# Patient Record
Sex: FEMALE | Race: BLACK OR AFRICAN AMERICAN | Employment: FULL TIME | URBAN - METROPOLITAN AREA
[De-identification: names, ages, dates, MRNs, and addresses within clinical notes are randomized per-mention and may not be internally consistent; named-entity substitution may affect disease eponyms.]

---

## 2017-06-29 ENCOUNTER — OFFICE VISIT (OUTPATIENT)
Dept: FAMILY MEDICINE CLINIC | Age: 22
End: 2017-06-29

## 2017-06-29 VITALS
HEIGHT: 65 IN | WEIGHT: 176 LBS | TEMPERATURE: 98.6 F | DIASTOLIC BLOOD PRESSURE: 84 MMHG | SYSTOLIC BLOOD PRESSURE: 122 MMHG | RESPIRATION RATE: 18 BRPM | OXYGEN SATURATION: 99 % | BODY MASS INDEX: 29.32 KG/M2 | HEART RATE: 78 BPM

## 2017-06-29 DIAGNOSIS — J01.00 ACUTE MAXILLARY SINUSITIS, RECURRENCE NOT SPECIFIED: Primary | ICD-10-CM

## 2017-06-29 DIAGNOSIS — Z30.41 ENCOUNTER FOR SURVEILLANCE OF CONTRACEPTIVE PILLS: ICD-10-CM

## 2017-06-29 RX ORDER — NORGESTIMATE AND ETHINYL ESTRADIOL 7DAYSX3 28
1 KIT ORAL DAILY
Qty: 28 TAB | Refills: 12 | Status: SHIPPED | OUTPATIENT
Start: 2017-06-29 | End: 2018-07-02 | Stop reason: SDUPTHER

## 2017-06-29 RX ORDER — CEFDINIR 300 MG/1
300 CAPSULE ORAL 2 TIMES DAILY
Qty: 20 CAP | Refills: 0 | Status: SHIPPED | OUTPATIENT
Start: 2017-06-29 | End: 2017-07-09

## 2017-06-29 NOTE — PROGRESS NOTES
HPI/ROS  Patient complains of bilateral ear pressure/pain. Symptoms include congestion, headache described as frontal, lightheadedness, low grade fever, post nasal drip, productive cough with  yellow colored sputum, sinus pressure, tooth pain and vertigo. Onset of symptoms was 5 days ago, gradually worsening since that time. Patient is drinking plenty of fluids. .  Past history is significant for no history of pneumonia or bronchitis. Patient is non-smoker. She is using mucinex otc without improvement. Visit Vitals    /84 (BP 1 Location: Right arm, BP Patient Position: Sitting)    Pulse 78    Temp 98.6 °F (37 °C) (Oral)    Resp 18    Ht 5' 5\" (1.651 m)    Wt 176 lb (79.8 kg)    LMP 05/30/2017    SpO2 99%    BMI 29.29 kg/m2     Physical Examination:   GENERAL ASSESSMENT: well developed and well nourished  SKIN: normal color, no lesions  HEAD: normocephalic  EYES: normal eyes  EARS: external auditory canal: clear and tympanic membrane: yellow, bulging  NOSE: normal external appearance and nares patent  MOUTH: yellow exudates of OP  NECK: normal  CHEST: normal air exchange, no rales, no rhonchi, no wheezes, respiratory effort normal with no retractions  HEART: regular rate and rhythm, normal S1/S2, no murmurs  ABDOMEN:  not examined  EXTREMITY: not examined  NEURO: not examined    Amber was seen today for sinus infection and eye drainage. Diagnoses and all orders for this visit:    Acute maxillary sinusitis, recurrence not specified    Encounter for surveillance of contraceptive pills  -     norgestimate-ethinyl estradiol (TRI-SPRINTEC, 28,) 0.18/0.215/0.25 mg-35 mcg (28) tab; Take 1 Tab by mouth daily. Other orders  -     cefdinir (OMNICEF) 300 mg capsule; Take 1 Cap by mouth two (2) times a day for 10 days. Reveiwed adr/se of medication  Push fluids, rest, suggested mucinex for congestion and drainage  Recheck 5-7 days if sx not improved.     I have discussed the diagnosis with the patient and the intended plan as seen in the above orders. The patient has received an after-visit summary and questions were answered concerning future plans. Patient conveyed understanding of the plan at the time of the visit. PRASHANTH Watkins, ANP  6/29/2017    Follow-up Disposition:  Return for well exam, fasting labs, and pap smear.

## 2017-06-29 NOTE — PROGRESS NOTES
Chief Complaint   Patient presents with    Sinus Infection    Eye Drainage     rt eye     Patient in office today for sx that began Tuesday; have not treated with otc. 1. Have you been to the ER, urgent care clinic since your last visit? Hospitalized since your last visit? No    2. Have you seen or consulted any other health care providers outside of the [address] [place] [address] since your last visit? Include any pap smears or colon screening.  No

## 2017-06-29 NOTE — MR AVS SNAPSHOT
Visit Information Date & Time Provider Department Dept. Phone Encounter #  
 6/29/2017  7:00 AM Julian Raymond NP [facility] [phone] [id number] Follow-up Instructions Return for well exam, fasting labs, and pap smear. Upcoming Health Maintenance Date Due  
 HPV AGE 9Y-34Y (1 of 3 - Female 3 Dose Series) 4/16/2005 DTaP/Tdap/Td series (1 - Tdap) 4/16/2015 INFLUENZA AGE 9 TO ADULT 8/1/2017 PAP AKA CERVICAL CYTOLOGY 12/11/2018 Allergies as of 6/29/2017  Review Complete On: 6/29/2017 By: Nuria Ruth LPN No Known Allergies Current Immunizations  Never Reviewed Name Date  
 TB Skin Test (PPD) Intradermal 7/25/2014 Not reviewed this visit You Were Diagnosed With   
  
 Codes Comments Acute maxillary sinusitis, recurrence not specified    -  Primary ICD-10-CM: J01.00 ICD-9-CM: 461.0 Encounter for surveillance of contraceptive pills     ICD-10-CM: Z30.41 ICD-9-CM: V25.41 Vitals BP Pulse Temp Resp Height(growth percentile) Weight(growth percentile) 122/84 (BP 1 Location: Right arm, BP Patient Position: Sitting) 78 98.6 °F (37 °C) (Oral) 18 5' 5\" (1.651 m) 176 lb (79.8 kg) LMP SpO2 BMI OB Status Smoking Status 05/30/2017 99% 29.29 kg/m2 Having regular periods Never Smoker Vitals History BMI and BSA Data Body Mass Index Body Surface Area  
 29.29 kg/m 2 1.91 m 2 Preferred Pharmacy Pharmacy Name Phone [facility] [address], [address]  [phone] Your Updated Medication List  
  
   
This list is accurate as of: 6/29/17  7:25 AM.  Always use your most recent med list.  
  
  
  
  
 cefdinir 300 mg capsule Commonly known as:  OMNICEF Take 1 Cap by mouth two (2) times a day for 10 days. cetirizine 10 mg tablet Commonly known as:  ZYRTEC Take 1 Tab by mouth daily. norgestimate-ethinyl estradiol 0.18/0.215/0.25 mg-35 mcg (28) Tab Commonly known as:  TRI-SPRINTEC (28) Take 1 Tab by mouth daily. Prescriptions Sent to Pharmacy Refills  
 cefdinir (OMNICEF) 300 mg capsule 0 Sig: Take 1 Cap by mouth two (2) times a day for 10 days. Class: Normal  
 Pharmacy: [facility] [address], [address] Ph #: [phone] Route: Oral  
 norgestimate-ethinyl estradiol (TRI-SPRINTEC, 28,) 0.18/0.215/0.25 mg-35 mcg (28) tab 12 Sig: Take 1 Tab by mouth daily. Class: Normal  
 Pharmacy: [facility] [address], [address] Ph #: [phone] Route: Oral  
  
Follow-up Instructions Return for well exam, fasting labs, and pap smear. Introducing [facility]! Kelsey Doe introduces [employer] patient portal. Now you can access parts of your medical record, email your doctor's office, and request medication refills online. 1. In your internet browser, go to [email or web address] 2. Click on the First Time User? Click Here link in the Sign In box. You will see the New Member Sign Up page. 3. Enter your [employer] Access Code exactly as it appears below. You will not need to use this code after youve completed the sign-up process. If you do not sign up before the expiration date, you must request a new code. · [employer] Access Code: [email or web address] Expires: 9/27/2017  7:25 AM 
 
4. Enter the last four digits of your Social Security Number (xxxx) and Date of Birth (mm/dd/yyyy) as indicated and click Submit. You will be taken to the next sign-up page. 5. Create a [employer] ID. This will be your [employer] login ID and cannot be changed, so think of one that is secure and easy to remember. 6. Create a [employer] password. You can change your password at any time. 7. Enter your Password Reset Question and Answer. This can be used at a later time if you forget your password. 8. Enter your e-mail address. You will receive e-mail notification when new information is available in [address] 9. Click Sign Up. You can now view and download portions of your medical record. 10. Click the Download Summary menu link to download a portable copy of your medical information. If you have questions, please visit the Frequently Asked Questions section of the [employer] website. Remember, [employer] is NOT to be used for urgent needs. For medical emergencies, dial 911. Now available from your iPhone and Android! Please provide this summary of care documentation to your next provider. Your primary care clinician is listed as [facility]. If you have any questions after today's visit, please call [phone].

## 2018-07-02 DIAGNOSIS — Z30.41 ENCOUNTER FOR SURVEILLANCE OF CONTRACEPTIVE PILLS: ICD-10-CM

## 2018-07-02 RX ORDER — NORGESTIMATE AND ETHINYL ESTRADIOL 7DAYSX3 28
1 KIT ORAL DAILY
Qty: 28 TAB | Refills: 12 | Status: SHIPPED | OUTPATIENT
Start: 2018-07-02 | End: 2019-06-24 | Stop reason: SDUPTHER

## 2019-04-09 ENCOUNTER — OFFICE VISIT (OUTPATIENT)
Dept: FAMILY MEDICINE CLINIC | Age: 24
End: 2019-04-09

## 2019-04-09 VITALS
TEMPERATURE: 98.3 F | OXYGEN SATURATION: 100 % | RESPIRATION RATE: 12 BRPM | HEART RATE: 79 BPM | WEIGHT: 182 LBS | SYSTOLIC BLOOD PRESSURE: 116 MMHG | HEIGHT: 65 IN | DIASTOLIC BLOOD PRESSURE: 77 MMHG | BODY MASS INDEX: 30.32 KG/M2

## 2019-04-09 DIAGNOSIS — Z00.00 WELL ADULT EXAM: Primary | ICD-10-CM

## 2019-04-09 RX ORDER — CETIRIZINE HCL 10 MG
10 TABLET ORAL DAILY
Qty: 30 TAB | Refills: 5 | Status: SHIPPED | OUTPATIENT
Start: 2019-04-09 | End: 2020-03-06 | Stop reason: SDUPTHER

## 2019-04-09 NOTE — PROGRESS NOTES
Chief Complaint   Patient presents with    Complete Physical    Labs     Pt in office today for cpe/labs  Pt would like refill on zyrtec    Pt has no other concerns

## 2019-04-10 LAB
ALBUMIN SERPL-MCNC: 4.3 G/DL (ref 3.5–5.5)
ALBUMIN/GLOB SERPL: 1.3 {RATIO} (ref 1.2–2.2)
ALP SERPL-CCNC: 51 IU/L (ref 39–117)
ALT SERPL-CCNC: 12 IU/L (ref 0–32)
AST SERPL-CCNC: 14 IU/L (ref 0–40)
BASOPHILS # BLD AUTO: 0 X10E3/UL (ref 0–0.2)
BASOPHILS NFR BLD AUTO: 0 %
BILIRUB SERPL-MCNC: 0.3 MG/DL (ref 0–1.2)
BUN SERPL-MCNC: 7 MG/DL (ref 6–20)
BUN/CREAT SERPL: 9 (ref 9–23)
CALCIUM SERPL-MCNC: 9.4 MG/DL (ref 8.7–10.2)
CHLORIDE SERPL-SCNC: 100 MMOL/L (ref 96–106)
CHOLEST SERPL-MCNC: 202 MG/DL (ref 100–199)
CO2 SERPL-SCNC: 22 MMOL/L (ref 20–29)
CREAT SERPL-MCNC: 0.81 MG/DL (ref 0.57–1)
EOSINOPHIL # BLD AUTO: 0.1 X10E3/UL (ref 0–0.4)
EOSINOPHIL NFR BLD AUTO: 1 %
ERYTHROCYTE [DISTWIDTH] IN BLOOD BY AUTOMATED COUNT: 14.2 % (ref 12.3–15.4)
GLOBULIN SER CALC-MCNC: 3.2 G/DL (ref 1.5–4.5)
GLUCOSE SERPL-MCNC: 91 MG/DL (ref 65–99)
HCT VFR BLD AUTO: 37.2 % (ref 34–46.6)
HDLC SERPL-MCNC: 72 MG/DL
HGB BLD-MCNC: 11.3 G/DL (ref 11.1–15.9)
IMM GRANULOCYTES # BLD AUTO: 0 X10E3/UL (ref 0–0.1)
IMM GRANULOCYTES NFR BLD AUTO: 0 %
INTERPRETATION, 910389: NORMAL
LDLC SERPL CALC-MCNC: 115 MG/DL (ref 0–99)
LYMPHOCYTES # BLD AUTO: 1.6 X10E3/UL (ref 0.7–3.1)
LYMPHOCYTES NFR BLD AUTO: 36 %
MCH RBC QN AUTO: 25.2 PG (ref 26.6–33)
MCHC RBC AUTO-ENTMCNC: 30.4 G/DL (ref 31.5–35.7)
MCV RBC AUTO: 83 FL (ref 79–97)
MONOCYTES # BLD AUTO: 0.4 X10E3/UL (ref 0.1–0.9)
MONOCYTES NFR BLD AUTO: 8 %
MORPHOLOGY BLD-IMP: ABNORMAL
NEUTROPHILS # BLD AUTO: 2.5 X10E3/UL (ref 1.4–7)
NEUTROPHILS NFR BLD AUTO: 55 %
PLATELET # BLD AUTO: 314 X10E3/UL (ref 150–379)
POTASSIUM SERPL-SCNC: 4.6 MMOL/L (ref 3.5–5.2)
PROT SERPL-MCNC: 7.5 G/DL (ref 6–8.5)
RBC # BLD AUTO: 4.48 X10E6/UL (ref 3.77–5.28)
SODIUM SERPL-SCNC: 138 MMOL/L (ref 134–144)
TRIGL SERPL-MCNC: 77 MG/DL (ref 0–149)
TSH SERPL DL<=0.005 MIU/L-ACNC: 1.11 UIU/ML (ref 0.45–4.5)
VLDLC SERPL CALC-MCNC: 15 MG/DL (ref 5–40)
WBC # BLD AUTO: 4.5 X10E3/UL (ref 3.4–10.8)

## 2019-04-20 NOTE — PROGRESS NOTES
Subjective:   21 y.o. female for Well Woman Check. Her gyne and breast care is done elsewhere by her Ob-Gyne physician. There are no active problems to display for this patient. Current Outpatient Medications   Medication Sig Dispense Refill    cetirizine (ZYRTEC) 10 mg tablet Take 1 Tab by mouth daily. 30 Tab 5    norgestimate-ethinyl estradiol (TRI-SPRINTEC, 28,) 0.18/0.215/0.25 mg-35 mcg (28) tab Take 1 Tab by mouth daily. 29 Tab 12     Family History   Problem Relation Age of Onset    Diabetes Mother     Cancer Father         lung     Social History     Tobacco Use    Smoking status: Never Smoker    Smokeless tobacco: Never Used   Substance Use Topics    Alcohol use: Yes             ROS: Feeling generally well. No TIA's or unusual headaches, no dysphagia. No prolonged cough. No dyspnea or chest pain on exertion. No abdominal pain, change in bowel habits, black or bloody stools. No urinary tract symptoms. No new or unusual musculoskeletal symptoms. Specific concerns today: none. Objective: The patient appears well, alert, oriented x 3, in no distress. Visit Vitals  /77 (BP 1 Location: Right arm, BP Patient Position: Sitting)   Pulse 79   Temp 98.3 °F (36.8 °C) (Oral)   Resp 12   Ht 5' 5\" (1.651 m)   Wt 182 lb (82.6 kg)   LMP 04/03/2019   SpO2 100%   BMI 30.29 kg/m²     ENT normal.  Neck supple. No adenopathy or thyromegaly. RAS. Lungs are clear, good air entry, no wheezes, rhonchi or rales. S1 and S2 normal, no murmurs, regular rate and rhythm. Abdomen soft without tenderness, guarding, mass or organomegaly. Extremities show no edema, normal peripheral pulses. Neurological is normal, no focal findings. Breast and Pelvic exams are deferred. Assessment/Plan:   Well Woman  lose weight, increase physical activity, follow low fat diet, follow low salt diet, routine labs ordered  Encounter Diagnoses   Name Primary?     Well adult exam Yes     Orders Placed This Encounter    LIPID PANEL    CBC WITH AUTOMATED DIFF    METABOLIC PANEL, COMPREHENSIVE    TSH 3RD GENERATION    CVD REPORT    cetirizine (ZYRTEC) 10 mg tablet     I have discussed the diagnosis with the patient and the intended plan as seen in the above orders. The patient has received an after-visit summary and questions were answered concerning future plans. Patient conveyed understanding of the plan at the time of the visit.     Toya Bond, MSN, ANP  4/20/2019

## 2019-06-24 ENCOUNTER — TELEPHONE (OUTPATIENT)
Dept: FAMILY MEDICINE CLINIC | Age: 24
End: 2019-06-24

## 2019-06-24 DIAGNOSIS — Z30.41 ENCOUNTER FOR SURVEILLANCE OF CONTRACEPTIVE PILLS: ICD-10-CM

## 2019-06-24 RX ORDER — NORGESTIMATE AND ETHINYL ESTRADIOL 7DAYSX3 28
1 KIT ORAL DAILY
Qty: 28 TAB | Refills: 12 | Status: SHIPPED | OUTPATIENT
Start: 2019-06-24 | End: 2020-06-22

## 2019-06-24 NOTE — TELEPHONE ENCOUNTER
----- Message from Roz Lugo sent at 6/24/2019  9:20 AM EDT -----  Regarding: OLGA Castillo/refill  Pt is requesting a refill on Tri-Sprintec, she is currently out of this medication. Pharmacy is  on file.  Pt's Best Contact Number: [phone]

## 2020-03-08 RX ORDER — CETIRIZINE HCL 10 MG
10 TABLET ORAL DAILY
Qty: 30 TAB | Refills: 5 | Status: SHIPPED | OUTPATIENT
Start: 2020-03-08 | End: 2020-09-27

## 2020-06-19 DIAGNOSIS — Z30.41 ENCOUNTER FOR SURVEILLANCE OF CONTRACEPTIVE PILLS: ICD-10-CM

## 2020-06-21 DIAGNOSIS — Z30.41 ENCOUNTER FOR SURVEILLANCE OF CONTRACEPTIVE PILLS: ICD-10-CM

## 2020-06-22 RX ORDER — NORGESTIMATE AND ETHINYL ESTRADIOL 7DAYSX3 28
KIT ORAL
Qty: 28 TAB | Refills: 11 | Status: SHIPPED | OUTPATIENT
Start: 2020-06-22

## 2020-06-22 RX ORDER — NORGESTIMATE AND ETHINYL ESTRADIOL 7DAYSX3 28
1 KIT ORAL DAILY
Qty: 28 TAB | Refills: 12 | Status: SHIPPED | OUTPATIENT
Start: 2020-06-22

## 2020-09-27 RX ORDER — CETIRIZINE HCL 10 MG
TABLET ORAL
Qty: 30 TAB | Refills: 4 | Status: SHIPPED | OUTPATIENT
Start: 2020-09-27

## 2022-10-19 ENCOUNTER — HOSPITAL ENCOUNTER (EMERGENCY)
Age: 27
Discharge: HOME | End: 2022-10-19
Attending: STUDENT IN AN ORGANIZED HEALTH CARE EDUCATION/TRAINING PROGRAM
Payer: COMMERCIAL

## 2022-10-19 VITALS
WEIGHT: 204 LBS | SYSTOLIC BLOOD PRESSURE: 125 MMHG | OXYGEN SATURATION: 98 % | HEART RATE: 77 BPM | RESPIRATION RATE: 16 BRPM | TEMPERATURE: 98 F | BODY MASS INDEX: 33.99 KG/M2 | DIASTOLIC BLOOD PRESSURE: 91 MMHG | HEIGHT: 65 IN

## 2022-10-19 DIAGNOSIS — N12 PYELONEPHRITIS: Primary | ICD-10-CM

## 2022-10-19 LAB
APPEARANCE UR: ABNORMAL
BACTERIA URNS QL MICRO: ABNORMAL /HPF
BILIRUB UR QL: NEGATIVE
COLOR UR: ABNORMAL
EPITH CASTS URNS QL MICRO: ABNORMAL /LPF
GLUCOSE UR STRIP.AUTO-MCNC: NEGATIVE MG/DL
HCG UR QL: NEGATIVE
HGB UR QL STRIP: ABNORMAL
KETONES UR QL STRIP.AUTO: NEGATIVE MG/DL
LEUKOCYTE ESTERASE UR QL STRIP.AUTO: ABNORMAL
NITRITE UR QL STRIP.AUTO: NEGATIVE
PH UR STRIP: 6 [PH] (ref 5–8)
PROT UR STRIP-MCNC: ABNORMAL MG/DL
RBC #/AREA URNS HPF: ABNORMAL /HPF
SP GR UR REFRACTOMETRY: 1.01 (ref 1–1.03)
UA: UC IF INDICATED,UAUC: ABNORMAL
UROBILINOGEN UR QL STRIP.AUTO: 0.2 EU/DL (ref 0.2–1)
WBC URNS QL MICRO: >100 /HPF (ref 0–4)

## 2022-10-19 PROCEDURE — 81001 URINALYSIS AUTO W/SCOPE: CPT

## 2022-10-19 PROCEDURE — 87186 SC STD MICRODIL/AGAR DIL: CPT

## 2022-10-19 PROCEDURE — 87077 CULTURE AEROBIC IDENTIFY: CPT

## 2022-10-19 PROCEDURE — 99283 EMERGENCY DEPT VISIT LOW MDM: CPT

## 2022-10-19 PROCEDURE — 74011250637 HC RX REV CODE- 250/637: Performed by: STUDENT IN AN ORGANIZED HEALTH CARE EDUCATION/TRAINING PROGRAM

## 2022-10-19 PROCEDURE — 87086 URINE CULTURE/COLONY COUNT: CPT

## 2022-10-19 RX ORDER — CEPHALEXIN 500 MG/1
500 CAPSULE ORAL 4 TIMES DAILY
Qty: 40 CAPSULE | Refills: 0 | Status: SHIPPED | OUTPATIENT
Start: 2022-10-19 | End: 2022-10-29

## 2022-10-19 RX ORDER — CEPHALEXIN 250 MG/1
500 CAPSULE ORAL
Status: COMPLETED | OUTPATIENT
Start: 2022-10-19 | End: 2022-10-19

## 2022-10-19 RX ORDER — NAPROXEN 250 MG/1
500 TABLET ORAL ONCE
Status: COMPLETED | OUTPATIENT
Start: 2022-10-19 | End: 2022-10-19

## 2022-10-19 RX ORDER — CYCLOBENZAPRINE HCL 10 MG
10 TABLET ORAL
Qty: 30 TABLET | Refills: 0 | Status: SHIPPED | OUTPATIENT
Start: 2022-10-19 | End: 2022-10-29

## 2022-10-19 RX ADMIN — NAPROXEN 500 MG: 250 TABLET ORAL at 22:21

## 2022-10-19 RX ADMIN — CEPHALEXIN 500 MG: 250 CAPSULE ORAL at 22:21

## 2022-10-20 NOTE — ED TRIAGE NOTES
Patient ambulatory to ED with complaint of bilateral lower back pain since Sunday night. Denies hx of kidney stones, urinary symptoms, unsure of chance of pregnancy LMP last week. Patient has not taken anything for pain since then.

## 2022-10-20 NOTE — ED PROVIDER NOTES
Patient is a 18-year-old female presented to ED with bilateral lower back pain over the kidneys since Sunday. Patient denies fevers chills or dysuria. No history of kidney stones. No known lifting injuries or back injuries. The history is provided by the patient. Back Pain   This is a new problem. The current episode started more than 2 days ago. The problem has not changed since onset. The problem occurs constantly. Patient reports not work related injury. The pain is associated with no known injury. The pain is present in the left side, right side and lower back. The quality of the pain is described as aching. The pain does not radiate. The pain is at a severity of 5/10. The pain is moderate. The symptoms are aggravated by certain positions. Pertinent negatives include no chest pain, no abdominal pain, no bladder incontinence and no dysuria. She has tried nothing for the symptoms. The treatment provided no relief. Risk factors include obesity. The patient's        No past medical history on file. No past surgical history on file.       Family History:   Problem Relation Age of Onset    Diabetes Mother     Cancer Father         lung       Social History     Socioeconomic History    Marital status: SINGLE     Spouse name: Not on file    Number of children: Not on file    Years of education: Not on file    Highest education level: Not on file   Occupational History    Not on file   Tobacco Use    Smoking status: Never    Smokeless tobacco: Never   Substance and Sexual Activity    Alcohol use: Yes    Drug use: Never    Sexual activity: Yes     Partners: Male   Other Topics Concern    Not on file   Social History Narrative    Not on file     Social Determinants of Health     Financial Resource Strain: Not on file   Food Insecurity: Not on file   Transportation Needs: Not on file   Physical Activity: Not on file   Stress: Not on file   Social Connections: Not on file   Intimate Partner Violence: Not on file Housing Stability: Not on file         ALLERGIES: Patient has no known allergies. Review of Systems   Constitutional: Negative. HENT: Negative. Eyes: Negative. Respiratory: Negative. Cardiovascular: Negative. Negative for chest pain. Gastrointestinal: Negative. Negative for abdominal pain. Endocrine: Negative. Genitourinary:  Positive for flank pain. Negative for bladder incontinence and dysuria. Musculoskeletal:  Positive for back pain. Skin: Negative. Allergic/Immunologic: Negative. Neurological: Negative. Hematological: Negative. Psychiatric/Behavioral: Negative. There were no vitals filed for this visit. Physical Exam  Vitals and nursing note reviewed. Constitutional:       General: She is not in acute distress. Appearance: Normal appearance. HENT:      Head: Normocephalic and atraumatic. Right Ear: External ear normal.      Left Ear: External ear normal.      Nose: Nose normal.   Eyes:      Extraocular Movements: Extraocular movements intact. Conjunctiva/sclera: Conjunctivae normal.   Cardiovascular:      Rate and Rhythm: Normal rate. Pulses: Normal pulses. Radial pulses are 2+ on the right side and 2+ on the left side. Heart sounds: Normal heart sounds. Pulmonary:      Effort: Pulmonary effort is normal.      Breath sounds: Normal breath sounds. Chest:      Chest wall: No deformity or tenderness. Abdominal:      General: Abdomen is flat. There is no distension. Tenderness: There is no abdominal tenderness. There is right CVA tenderness and left CVA tenderness. Musculoskeletal:         General: No deformity or signs of injury. Normal range of motion. Cervical back: Normal range of motion and neck supple. No tenderness. Skin:     General: Skin is warm and dry. Capillary Refill: Capillary refill takes less than 2 seconds. Neurological:      General: No focal deficit present.       Mental Status: She is alert and oriented to person, place, and time. Psychiatric:         Attention and Perception: Attention normal.         Mood and Affect: Mood normal.         Behavior: Behavior normal.        [facility]    ED Course as of 10/20/22 0511   Wed Oct 19, 2022   2154 Blood(!): MODERATE [AL]   2154 Leukocyte Esterase(!): LARGE [AL]   2154 Bacteria(!): 2+ [AL]      ED Course User Index  [AL] Froilan Garcia MD     LABORATORY RESULTS:  Labs Reviewed   URINALYSIS W/ REFLEX CULTURE - Abnormal; Notable for the following components:       Result Value    Appearance HAZY (*)     Protein TRACE (*)     Blood MODERATE (*)     Leukocyte Esterase LARGE (*)     WBC >100 (*)     Bacteria 2+ (*)     All other components within normal limits   CULTURE, URINE   HCG URINE, QL. - POC       MEDICATIONS GIVEN:  Medications   cephALEXin (KEFLEX) capsule 500 mg (500 mg Oral Given 10/19/22 2221)   naproxen (NAPROSYN) tablet 500 mg (500 mg Oral Given 10/19/22 2221)       Differential diagnosis: Kidney stone, pyelonephritis, UTI, muscle skeletal pain    ED physician interpretation of laboratory results: UA with signs of significant infection including bacteria, leukocyte esterase and WBCs. Moderate blood. MDM: Patient is a 26-year-old female presented ED with bilateral flank/lower back pain with a UA showing signs of infection concerning for pyelonephritis. Doubt bilateral kidney stones, this is most likely pyelonephritis. As patient is stable tolerating p.o. no fevers she is appropriate discharge home and outpatient oral antibiotics. Keflex and naproxen given here in the ED. Strict return precautions given. Further personalized recommendations for outpatient care as below. Key discharge instructions and summary of care: You presents to the ED with 4 days of bilateral flank pain. Your urine shows significant signs for infection. Based on your symptoms there is concern for pyelonephritis/kidney infection.   A dose of antibiotics was given here in the ED. Prescription written for the same. Take as prescribed for 10 days. To treat your symptoms of pain recommend Tylenol 1000 mg every 6 hours, naproxen/Aleve 440 mg every 12 hours. A prescription was sent for Flexeril as there may be some muscular pain present. If symptoms change or worsen, if you are unable to take your medications due to nausea or vomiting return to the ED for further evaluation. Patient is stable for discharge. All available radiology and laboratory results have been reviewed with patient and/or available family. Patient and/or family verbally conveyed their understanding and agreement of the patient's signs, symptoms, diagnosis, treatment and prognosis and additionally agree to follow-up as recommended in the discharge instructions or to return to the Emergency Department should their condition change or worsen prior to their follow-up appointment. All questions have been answered and patient and/or available family express understanding. IMPRESSION:  1. Pyelonephritis        DISPOSITION: Discharged    Vidal Beckford MD      Procedures

## 2022-10-20 NOTE — DISCHARGE INSTRUCTIONS
You presents to the ED with 4 days of bilateral flank pain. Your urine shows significant signs for infection. Based on your symptoms there is concern for pyelonephritis/kidney infection. A dose of antibiotics was given here in the ED. Prescription written for the same. Take as prescribed for 10 days. To treat your symptoms of pain recommend Tylenol 1000 mg every 6 hours, naproxen/Aleve 440 mg every 12 hours. A prescription was sent for Flexeril as there may be some muscular pain present. If symptoms change or worsen, if you are unable to take your medications due to nausea or vomiting return to the ED for further evaluation.

## 2022-10-22 LAB
BACTERIA SPEC CULT: ABNORMAL
CC UR VC: ABNORMAL
SERVICE CMNT-IMP: ABNORMAL

## 2023-05-23 RX ORDER — NORGESTIMATE AND ETHINYL ESTRADIOL 7DAYSX3 28
1 KIT ORAL DAILY
COMMUNITY
Start: 2020-06-22

## 2023-05-23 RX ORDER — CETIRIZINE HYDROCHLORIDE 10 MG/1
1 TABLET ORAL DAILY
COMMUNITY
Start: 2020-09-27

## 2024-12-03 ENCOUNTER — TELEPHONE (OUTPATIENT)
Age: 29
End: 2024-12-03

## 2024-12-03 NOTE — TELEPHONE ENCOUNTER
Received a call from the pt regarding her upcoming appt. She had a questions around options for nausea until she is seen for her first OB appt. Per Dr. Ro she has given me the okay to let her know about the Unisom and B6 combo OTC. Pt has expressed understanding.

## 2024-12-09 ENCOUNTER — OFFICE VISIT (OUTPATIENT)
Age: 29
End: 2024-12-09

## 2024-12-09 VITALS
DIASTOLIC BLOOD PRESSURE: 74 MMHG | HEART RATE: 84 BPM | TEMPERATURE: 98 F | OXYGEN SATURATION: 99 % | SYSTOLIC BLOOD PRESSURE: 115 MMHG | RESPIRATION RATE: 18 BRPM | WEIGHT: 200 LBS | BODY MASS INDEX: 33.28 KG/M2

## 2024-12-09 DIAGNOSIS — Z3A.01 LESS THAN 8 WEEKS GESTATION OF PREGNANCY: ICD-10-CM

## 2024-12-09 DIAGNOSIS — R52 GENERALIZED BODY ACHES: ICD-10-CM

## 2024-12-09 DIAGNOSIS — J00 ACUTE NASOPHARYNGITIS: Primary | ICD-10-CM

## 2024-12-09 LAB
INFLUENZA VIRUS A RNA: NEGATIVE
INFLUENZA VIRUS B RNA: NEGATIVE

## 2024-12-09 NOTE — PATIENT INSTRUCTIONS
Results for orders placed or performed in visit on 12/09/24   POCT Influenza A/B DNA   Result Value Ref Range    Influenza virus A RNA negative     Influenza virus B RNA negative       You have been diagnosed with an Upper respiratory viral infection. It is important to monitor your fever and take Tylenol and/or ibuprofen to keep your fever down and reduce body aches. For nasal congestion, Flonase nasal spray may be used for nasal stuffiness.   Chloraseptic spray and Cepacol lozenges will help relieve the throat discomfort.    It is also important to maintain good hydration, drink at least 64 ounces of fluid, water, juice, gingerale and gatorade are good choices. Avoid drinks with caffeine as they do not hydrate. Monitor for good urine output.     If fever persists, you develop abdominal pain, vomiting or shortness of breath, or do not  improve, please call PCP or go to nearest ED.     Your fever usually resolves in 48 to 72 hours. Other symptoms, such as cough and nasal stuffiness usually resolve in 7 to 10 days, but may last longer.    Thank you for coming in to the [facility] today. I hope you are feeling better soon!

## 2024-12-09 NOTE — PROGRESS NOTES
Iqra Dunn (:  1995) is a 29 y.o. female,New patient, here for evaluation of the following chief complaint(s):  Cold Symptoms (sore throat, sinus pressure and congestion, body aches since Friday /)        Assessment    1. Acute nasopharyngitis  2. Generalized body aches  -     POCT Influenza A/B DNA  3. Less than 8 weeks gestation of pregnancy     Plan    Influenza testing was negative.  We discussed treating symptoms with as much homeopathic care as possible such as warm tea and honey for her sore throat and Tylenol for the body aches or if she develops fever.  I did advise against using Sudafed dextromethorphan as these can elevate blood pressure.    I have discussed the results of my assessment, diagnosis and treatment plan with the patient. The patient also understands that early in the process of an illness, an Urgent Care work-up can be falsely reassuring. Therefore, if symptoms change, worsen or do not resolve and remain persistent, they should return to Urgent Care or seek further evaluation in the ED for immediate assessment. Additionally, they should continue care with their Primary provider. No further questions remained and patient was discharged to home.      Subjective      Cold Symptoms     29-year-old female presents to clinic today for evaluation of cold symptoms.  Patient also is noted to be approximately 6 weeks gestation with first OB appointment scheduled for 2024.  Patient states symptoms started about 72 hours ago, initially with a sore throat.  She has gone on to develop sinus pressure, congestion, and body aches.  She does not report  any occurrence of fever. Patient denies any issues with nausea or vomiting.  She does have a 1-1/2-year-old child at home with a runny nose and cough.       No Known Allergies    History reviewed. No pertinent past medical history. Except for early gestation pregnancy.       Objective     Vitals:    24 1244   BP: 115/74   Pulse: 84

## 2024-12-12 DIAGNOSIS — O36.80X0 ENCOUNTER TO DETERMINE FETAL VIABILITY OF PREGNANCY, SINGLE OR UNSPECIFIED FETUS: Primary | ICD-10-CM

## 2024-12-27 ENCOUNTER — TELEPHONE (OUTPATIENT)
Age: 29
End: 2024-12-27

## 2024-12-27 NOTE — TELEPHONE ENCOUNTER
Called patient to reschedule appt due to US being out, Left VM to call back when phones turn on at 8:30am

## 2024-12-31 NOTE — PROGRESS NOTES
Initial Prenatal Note    Iqra Dunn is a 29 y.o. female presents for a new pregnancy visit.    Chief Complaint   Patient presents with    Initial Prenatal Visit    Amenorrhea       LMP history:  Patient's last menstrual period was 10/26/2024.  Her last menstrual period was Regular every month without spotting, flow is moderate, and usually lasting less than 6 days.  A urine pregnancy test was positive 6 weeks ago. She was not on the pill at conception.     Based on her LMP, her EGA is 9 weeks 4 days and her EGA is 8/3/25.      Ultrasound data:  She had an ultrasound done by the ultrasound tech today which revealed a viable twin pregnancy with Baby A gestational age of 10 weeks and 5 days giving an EDC of 25 and Baby B gestational age of 10 weeks and 5 days giving an EDC of 25.    Pregnancy symptoms:  Since her LMP she has experienced breast tenderness  and nausea  She has not been vomiting over the last few weeks.  Associated signs and symptoms which she denies: dysuria, discharge, vaginal bleeding.    She states she has not gained weight:      Relevant past pregnancy history:   - LTCS x 1  She does history of  delivery - Pre Eclampsia      Relevant past medical history:   Noncontributory    Last Pap: , Normal - no abnormal history  Hx of STI - No  With regard to the Gardisil vaccine, she has not received it yet    Relevant social history:  Her occupation is: [employer] - .   Her partner's name is Cathy - Nohemy.  Current children: Marisela      1. Have you been to the ER, urgent care clinic, or hospitalized since your last visit? New Patient   2. Have you seen or consulted any other health care providers outside of the [facility] since your last visit? New Patient      She declines  a chaperone during the gynecologic exam today.

## 2025-01-02 ENCOUNTER — INITIAL PRENATAL (OUTPATIENT)
Age: 30
End: 2025-01-02

## 2025-01-02 VITALS
HEIGHT: 65 IN | TEMPERATURE: 97.9 F | HEART RATE: 80 BPM | SYSTOLIC BLOOD PRESSURE: 120 MMHG | RESPIRATION RATE: 16 BRPM | DIASTOLIC BLOOD PRESSURE: 82 MMHG | WEIGHT: 196.4 LBS | OXYGEN SATURATION: 98 % | BODY MASS INDEX: 32.72 KG/M2

## 2025-01-02 DIAGNOSIS — Z34.90 PREGNANCY, UNSPECIFIED GESTATIONAL AGE: Primary | ICD-10-CM

## 2025-01-02 LAB
CREAT UR-MCNC: 108 MG/DL
PROT UR-MCNC: 8 MG/DL (ref 0–11.9)
PROT/CREAT UR-RTO: 0.1

## 2025-01-02 PROCEDURE — 0500F INITIAL PRENATAL CARE VISIT: CPT | Performed by: OBSTETRICS & GYNECOLOGY

## 2025-01-02 SDOH — ECONOMIC STABILITY: FOOD INSECURITY: WITHIN THE PAST 12 MONTHS, YOU WORRIED THAT YOUR FOOD WOULD RUN OUT BEFORE YOU GOT MONEY TO BUY MORE.: NEVER TRUE

## 2025-01-02 SDOH — ECONOMIC STABILITY: FOOD INSECURITY: WITHIN THE PAST 12 MONTHS, THE FOOD YOU BOUGHT JUST DIDN'T LAST AND YOU DIDN'T HAVE MONEY TO GET MORE.: NEVER TRUE

## 2025-01-02 SDOH — ECONOMIC STABILITY: INCOME INSECURITY: HOW HARD IS IT FOR YOU TO PAY FOR THE VERY BASICS LIKE FOOD, HOUSING, MEDICAL CARE, AND HEATING?: NOT HARD AT ALL

## 2025-01-02 ASSESSMENT — PATIENT HEALTH QUESTIONNAIRE - PHQ9
7. TROUBLE CONCENTRATING ON THINGS, SUCH AS READING THE NEWSPAPER OR WATCHING TELEVISION: NOT AT ALL
9. THOUGHTS THAT YOU WOULD BE BETTER OFF DEAD, OR OF HURTING YOURSELF: NOT AT ALL
5. POOR APPETITE OR OVEREATING: NOT AT ALL
6. FEELING BAD ABOUT YOURSELF - OR THAT YOU ARE A FAILURE OR HAVE LET YOURSELF OR YOUR FAMILY DOWN: NOT AT ALL
SUM OF ALL RESPONSES TO PHQ QUESTIONS 1-9: 1
2. FEELING DOWN, DEPRESSED OR HOPELESS: NOT AT ALL
SUM OF ALL RESPONSES TO PHQ9 QUESTIONS 1 & 2: 0
8. MOVING OR SPEAKING SO SLOWLY THAT OTHER PEOPLE COULD HAVE NOTICED. OR THE OPPOSITE, BEING SO FIGETY OR RESTLESS THAT YOU HAVE BEEN MOVING AROUND A LOT MORE THAN USUAL: NOT AT ALL
4. FEELING TIRED OR HAVING LITTLE ENERGY: NOT AT ALL
10. IF YOU CHECKED OFF ANY PROBLEMS, HOW DIFFICULT HAVE THESE PROBLEMS MADE IT FOR YOU TO DO YOUR WORK, TAKE CARE OF THINGS AT HOME, OR GET ALONG WITH OTHER PEOPLE: NOT DIFFICULT AT ALL
SUM OF ALL RESPONSES TO PHQ QUESTIONS 1-9: 1
3. TROUBLE FALLING OR STAYING ASLEEP: SEVERAL DAYS
SUM OF ALL RESPONSES TO PHQ QUESTIONS 1-9: 1
SUM OF ALL RESPONSES TO PHQ QUESTIONS 1-9: 1
1. LITTLE INTEREST OR PLEASURE IN DOING THINGS: NOT AT ALL

## 2025-01-02 NOTE — PROGRESS NOTES
Initial Prenatal Visit    CC: Amenorrhea, positive pregnancy test    HPI:  Iqra Dunn is a 29 y.o.  who presents for initial prenatal appointment. Since her LMP she has experienced nausea.  She denies dysuria, discharge, vaginal bleeding. She endorses breast tenderness.    LMP history:  The date of her LMP is 10/26/2024 .  Her last menstrual period was normal and her LMP is certain.       Ultrasound data:  She had an ultrasound performed today in the office which revealed a viable murphy pregnancy with a gestational age of 10w5d giving an EDC of 2025.    Past pregnancy history:  OB History    Para Term  AB Living   2 1   1   1   SAB IAB Ectopic Molar Multiple Live Births             1      # Outcome Date GA Lbr Tam/2nd Weight Sex Type Anes PTL Lv   2 Current            1  23 29w0d   F CS-LTranv Spinal  LAZARUS      Complications: Pre-eclampsia     - History of  delivery at 29 weeks complicated by pre-eclampsia. Reports no problems in pregnancy until 28 weeks. Daughter Marisela- will request records from [facility]   _ _ _ _ _ _ _ _ _ _ _ _ _ _ _ _ _ _ _ _ _ _ _ _ _ _ _ _ _ _ _ _     Substance history: Negative for alcohol, tobacco and street drugs.           Positive for nothing.  Exposure history:   There are not  indoor cats in the home.  The patient was instructed to not change the cat litter.   She admits close contact with children on a regular basis.   She has had chicken pox or the vaccine in the past.   Patient denies issues with domestic violence.     Genetic Screening/Teratology Counseling: (Includes patient, baby's father, or anyone in either family with:)  1.  Patient's age >/= 35 at EDC?--no  2.  Thalassemia (Cambodian, Greek, Mediterranean, or  background): MCV<80?--no.     3.  Neural tube defect (meningomyelocele, spina bifida, anencephaly)?--no.   4.  Congenital heart defect?--no.  5.  Down syndrome?--no.   6.  Galdino-Sachs (Uatsdin, Nepalese

## 2025-01-03 PROBLEM — Z34.90 PREGNANCY: Status: ACTIVE | Noted: 2025-01-03

## 2025-01-03 LAB
BACTERIA SPEC CULT: NORMAL
CC UR VC: NORMAL
SERVICE CMNT-IMP: NORMAL

## 2025-01-04 LAB
C TRACH RRNA SPEC QL NAA+PROBE: NEGATIVE
N GONORRHOEA RRNA SPEC QL NAA+PROBE: NEGATIVE
SPECIMEN SOURCE: NORMAL
T VAGINALIS RRNA SPEC QL NAA+PROBE: NEGATIVE

## 2025-01-17 ENCOUNTER — ROUTINE PRENATAL (OUTPATIENT)
Age: 30
End: 2025-01-17

## 2025-01-17 VITALS
RESPIRATION RATE: 16 BRPM | WEIGHT: 195.8 LBS | SYSTOLIC BLOOD PRESSURE: 117 MMHG | OXYGEN SATURATION: 97 % | HEART RATE: 90 BPM | TEMPERATURE: 98.4 F | BODY MASS INDEX: 32.58 KG/M2 | DIASTOLIC BLOOD PRESSURE: 74 MMHG

## 2025-01-17 DIAGNOSIS — Z34.81 ENCOUNTER FOR SUPERVISION OF OTHER NORMAL PREGNANCY, FIRST TRIMESTER: ICD-10-CM

## 2025-01-17 DIAGNOSIS — Z34.90 PREGNANCY, UNSPECIFIED GESTATIONAL AGE: Primary | ICD-10-CM

## 2025-01-17 LAB
ABO + RH BLD: NORMAL
ALBUMIN SERPL-MCNC: 3.5 G/DL (ref 3.5–5)
ALBUMIN/GLOB SERPL: 0.8 (ref 1.1–2.2)
ALP SERPL-CCNC: 68 U/L (ref 45–117)
ALT SERPL-CCNC: 20 U/L (ref 12–78)
ANION GAP SERPL CALC-SCNC: 9 MMOL/L (ref 2–12)
AST SERPL-CCNC: 10 U/L (ref 15–37)
BILIRUB SERPL-MCNC: 0.8 MG/DL (ref 0.2–1)
BLOOD BANK CMNT PATIENT-IMP: NORMAL
BLOOD GROUP ANTIBODIES SERPL: NORMAL
BUN SERPL-MCNC: 6 MG/DL (ref 6–20)
BUN/CREAT SERPL: 8 (ref 12–20)
C. TRACHOMATIS, EXTERNAL RESULT: NEGATIVE
CALCIUM SERPL-MCNC: 9.8 MG/DL (ref 8.5–10.1)
CHLORIDE SERPL-SCNC: 105 MMOL/L (ref 97–108)
CO2 SERPL-SCNC: 20 MMOL/L (ref 21–32)
CREAT SERPL-MCNC: 0.77 MG/DL (ref 0.55–1.02)
ERYTHROCYTE [DISTWIDTH] IN BLOOD BY AUTOMATED COUNT: 13.9 % (ref 11.5–14.5)
GBS, EXTERNAL RESULT: POSITIVE
GLOBULIN SER CALC-MCNC: 4.2 G/DL (ref 2–4)
GLUCOSE SERPL-MCNC: 86 MG/DL (ref 65–100)
HBV SURFACE AG SER QL: 0.99 INDEX
HBV SURFACE AG SER QL: NEGATIVE
HCT VFR BLD AUTO: 35.5 % (ref 35–47)
HCV AB SER IA-ACNC: 0.15 INDEX
HCV AB SERPL QL IA: NONREACTIVE
HEPATITIS C ANTIBODY, EXTERNAL RESULT: NORMAL
HGB BLD-MCNC: 11.6 G/DL (ref 11.5–16)
HIV 1+2 AB+HIV1 P24 AG SERPL QL IA: NONREACTIVE
HIV 1/2 RESULT COMMENT: NORMAL
HIV, EXTERNAL RESULT: NORMAL
MCH RBC QN AUTO: 27 PG (ref 26–34)
MCHC RBC AUTO-ENTMCNC: 32.7 G/DL (ref 30–36.5)
MCV RBC AUTO: 82.8 FL (ref 80–99)
N. GONORRHOEAE, EXTERNAL RESULT: NEGATIVE
NRBC # BLD: 0 K/UL (ref 0–0.01)
NRBC BLD-RTO: 0 PER 100 WBC
PLATELET # BLD AUTO: 264 K/UL (ref 150–400)
PMV BLD AUTO: 10.7 FL (ref 8.9–12.9)
POTASSIUM SERPL-SCNC: 4 MMOL/L (ref 3.5–5.1)
PROT SERPL-MCNC: 7.7 G/DL (ref 6.4–8.2)
RBC # BLD AUTO: 4.29 M/UL (ref 3.8–5.2)
RUBELLA TITER, EXTERNAL RESULT: NORMAL
SODIUM SERPL-SCNC: 134 MMOL/L (ref 136–145)
SPECIMEN EXP DATE BLD: NORMAL
T. PALLIDUM (SYPHILIS) ANTIBODY, EXTERNAL RESULT: NORMAL
WBC # BLD AUTO: 4.9 K/UL (ref 3.6–11)

## 2025-01-17 PROCEDURE — 0502F SUBSEQUENT PRENATAL CARE: CPT | Performed by: OBSTETRICS & GYNECOLOGY

## 2025-01-17 NOTE — PROGRESS NOTES
Subjective: Doing well. Good FM. Denies VB, LOF.    /74 (Site: Right Upper Arm, Position: Sitting, Cuff Size: Medium Adult)   Pulse 90   Temp 98.4 °F (36.9 °C) (Oral)   Resp 16   Wt 88.8 kg (195 lb 12.8 oz)   LMP 10/26/2024   SpO2 97%   BMI 32.58 kg/m²          FHTS x2 present on v-scan    A/P  Iqra Dunn is a 29 y.o.  at 12w6d with pregnancy complicated by:   - Di/Di twin pregnancy  - Prenatal labs done today  - Early glucola at next visit (16 weeks)  - Recommend starting a baby aspirin given history of pre-eclampsia  - Baseline pre-E labs collected today    Patient Active Problem List    Diagnosis Date Noted    Pregnancy 2025     Primary Provider: Jayjay   (C/s x1 at 29 weeks due to preE with severe features)  EDC by FTUS    Pregnancy problems:  Di/Di twin pregnancy  - Follow up growth scans    Obesity, pre preg BMI 33    History of pre-eclampsia with severe features, 29 week delivery  - Baby aspirin this pregnancy  - Baseline pre-E labs with initial prenatal labs    Routine OB:  - PNLs: , R, VZV, RPR , Hep B , Hep C , HIV , G/C/T neg, pap , urine cx NG  -Baseline Pre- E labs: UPC 0.1,      [ ] RhoGAM @ 24-28wks if Rh negative  - Genetic Screening:  - Anatomy scan:  - Vaccines: [ ] Flu  [ ] TDAP [ ] RSV  - 1 hr GTT:   - Third Tri Labs:  - [ ] GBS  - [ ] Fetal presentation    Pain mgmt. 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 Partner Cathy works in maintenance. Daughter Marisela.          Marylu Ro MD

## 2025-01-19 LAB
RUBV IGG SERPL IA-ACNC: NORMAL IU/ML
VZV IGG SER IA-ACNC: NON REACTIVE

## 2025-01-20 LAB — T PALLIDUM AB SER QL IA: NON REACTIVE

## 2025-01-22 ENCOUNTER — ROUTINE PRENATAL (OUTPATIENT)
Age: 30
End: 2025-01-22
Payer: COMMERCIAL

## 2025-01-22 VITALS — SYSTOLIC BLOOD PRESSURE: 128 MMHG | HEART RATE: 86 BPM | DIASTOLIC BLOOD PRESSURE: 78 MMHG

## 2025-01-22 DIAGNOSIS — Z3A.13 13 WEEKS GESTATION OF PREGNANCY: Primary | ICD-10-CM

## 2025-01-22 DIAGNOSIS — O30.041 DICHORIONIC DIAMNIOTIC TWIN PREGNANCY IN FIRST TRIMESTER: ICD-10-CM

## 2025-01-22 DIAGNOSIS — O09.299 HISTORY OF PRE-ECLAMPSIA IN PRIOR PREGNANCY, CURRENTLY PREGNANT: ICD-10-CM

## 2025-01-22 DIAGNOSIS — Z98.891 HISTORY OF CESAREAN DELIVERY: ICD-10-CM

## 2025-01-22 LAB
HEMOGLOBIN A2: ABNORMAL % (ref 1.8–3.2)
HEMOGLOBIN A: 98.1 % (ref 96.4–98.8)
HEMOGLOBIN C: 0 %
HEMOGLOBIN E%: 0 %
HEMOGLOBIN F: 0 % (ref 0–2)
HEMOGLOBIN S: 0 %
HEMOGLOBIN VARIANT: 0.6 %
HGB A MFR BLD: ABNORMAL % (ref 96.4–98.8)
HGB A2 MFR BLD COLUMN CHROM: 1.3 % (ref 1.8–3.2)
HGB F MFR BLD: ABNORMAL % (ref 0–2)
HGB FRACT BLD-IMP: ABNORMAL
HGB S MFR BLD: ABNORMAL %
INTERPRETATION: ABNORMAL

## 2025-01-22 PROCEDURE — 76801 OB US < 14 WKS SINGLE FETUS: CPT | Performed by: STUDENT IN AN ORGANIZED HEALTH CARE EDUCATION/TRAINING PROGRAM

## 2025-01-22 PROCEDURE — 76802 OB US < 14 WKS ADDL FETUS: CPT | Performed by: STUDENT IN AN ORGANIZED HEALTH CARE EDUCATION/TRAINING PROGRAM

## 2025-01-22 PROCEDURE — 99204 OFFICE O/P NEW MOD 45 MIN: CPT | Performed by: STUDENT IN AN ORGANIZED HEALTH CARE EDUCATION/TRAINING PROGRAM

## 2025-01-22 RX ORDER — ASPIRIN 81 MG/1
81 TABLET ORAL DAILY
Qty: 90 TABLET | Refills: 1 | Status: SHIPPED | OUTPATIENT
Start: 2025-01-22

## 2025-01-22 NOTE — PROCEDURES
PATIENT: SAMIRA NUNEZ   -  : 1995   -  DOS:2025   -  INTERPRETING PROVIDER:Christine England,   Indication  ========    Advanced Maternal Age, Twins, dichorionic/diamniotic, hx preE (del 29wks)    Method  ======    Transabdominal ultrasound examination. View: suboptimal due to unfavorable fetal position    Pregnancy  =========    twin pregnancy. Number of fetuses: 2. Dichorionic-diamniotic    Dating  ======    LMP on: 10/26/2024  GA by LMP 12 w + 4 d  FLORENTINO by LMP: 2025  Prior assessment on: 2024  Prior assessment by: From outside records  GA by prior assessment 13 w + 4 d  FLORENTINO by prior assessment: 2025  Ultrasound examination on: 2025  GA by U/S based upon: CRL  GA by U/S 13 w + 5 d  FLORENTINO by U/S: 2025  GA by U/S based upon (Fetus 2): CRL  GA by U/S (Fetus 2) 13 w + 0 d  FLORENTINO by U/S (Fetus 2): 2025  Assigned: based on stated FLORENTINO (on 2024, From outside records), selected on 2025  Assigned GA 13 w + 4 d  Assigned FLORENTINO: 2025    Fetus 1: General Evaluation  ==============    Cardiac activity present  Placenta: Anterior  Amniotic fluid: normal amount    Fetus 1: Fetal Biometry  ============    Standard   bpm  98% Nicolaides  CRL 76.4 mm 13w 5d 57% Hadlock  Extended  Nasal bone: present    Fetus 1: Fetal Anatomy  ===========    The following structures could not be adequately visualized:  Face: Profile.    The following structures were visualized:  Cranium: Midline falx  Choroid plexus. Abdominal wall: Intact. Stomach. Bladder. Arms. Legs.    Fetus 2: General Evaluation  ==============    Cardiac activity present  Placenta: Anterior  Amniotic fluid: normal amount    Fetus 2: Fetal Biometry  ============    Standard   bpm  86% Nicolaides  CRL 67.2 mm 13w 0d 16% Hadlock  Extended  Nasal bone: present    Fetus 2: Fetal Anatomy  ===========    The following structures appear normal:  Stomach. Bladder.    The following structures could not be adequately

## 2025-01-22 NOTE — PROGRESS NOTES
Patient was seen 1/22/2025      Please look under media to view full consult and ultrasound report in ViewPoint.       Christine England MD   Maternal Fetal Medicine

## 2025-01-27 ENCOUNTER — TELEPHONE (OUTPATIENT)
Age: 30
End: 2025-01-27

## 2025-01-27 LAB
Lab: NORMAL
NTRA FETAL FRACTION SECOND FETUS: NORMAL
NTRA FETAL FRACTION: NORMAL
NTRA FETAL RHD SUMMARY: NORMAL
NTRA GENDER OF FETUS: NORMAL
NTRA GENDER OF SECOND FETUS: NORMAL
NTRA TRIPLOIDY RESULT TEXT: NORMAL
NTRA TRISOMY 13 AGE-BASED RISK TEXT: NORMAL
NTRA TRISOMY 13 RESULT TEXT: NORMAL
NTRA TRISOMY 13 RISK SCORE TEXT: NORMAL
NTRA TRISOMY 18 AGE-BASED RISK TEXT: NORMAL
NTRA TRISOMY 18 RESULT TEXT: NORMAL
NTRA TRISOMY 18 RISK SCORE TEXT: NORMAL
NTRA TRISOMY 21 AGE-BASED RISK TEXT: NORMAL
NTRA TRISOMY 21 RESULT TEXT: NORMAL
NTRA TRISOMY 21 RISK SCORE TEXT: NORMAL
NTRA ZYGOSITY: NORMAL

## 2025-01-27 NOTE — TELEPHONE ENCOUNTER
Left message for patient to call back regarding lab results.  Please discuss results with patient when she calls back.

## 2025-01-30 ENCOUNTER — TELEPHONE (OUTPATIENT)
Age: 30
End: 2025-01-30

## 2025-01-30 NOTE — TELEPHONE ENCOUNTER
Patient called, name and  verified. Patient is calling c/o a headache starting yesterday that will not let up despite Tylenol.    She reports that she is taking her baby ASA due to pre-e in previous pregnancy.    She denies and floaters or vision disturbances. She also denies any RUQ abdominal pain. She feels okay de spit the headache.    She has a co-worker take her BP and it came to 132/78. Please let me know what messages to relay to the pt. She is currently a  and 14w5d. Age is 29. Pt denies any additional sx.

## 2025-02-03 DIAGNOSIS — O21.9 NAUSEA AND VOMITING DURING PREGNANCY: Primary | ICD-10-CM

## 2025-02-03 RX ORDER — ONDANSETRON 4 MG/1
4 TABLET, FILM COATED ORAL EVERY 8 HOURS PRN
Qty: 30 TABLET | Refills: 1 | Status: SHIPPED | OUTPATIENT
Start: 2025-02-03

## 2025-02-10 DIAGNOSIS — Z34.90 PREGNANCY, UNSPECIFIED GESTATIONAL AGE: Primary | ICD-10-CM

## 2025-02-10 SDOH — ECONOMIC STABILITY: FOOD INSECURITY: WITHIN THE PAST 12 MONTHS, THE FOOD YOU BOUGHT JUST DIDN'T LAST AND YOU DIDN'T HAVE MONEY TO GET MORE.: SOMETIMES TRUE

## 2025-02-10 SDOH — ECONOMIC STABILITY: INCOME INSECURITY: IN THE LAST 12 MONTHS, WAS THERE A TIME WHEN YOU WERE NOT ABLE TO PAY THE MORTGAGE OR RENT ON TIME?: NO

## 2025-02-10 SDOH — ECONOMIC STABILITY: FOOD INSECURITY: WITHIN THE PAST 12 MONTHS, YOU WORRIED THAT YOUR FOOD WOULD RUN OUT BEFORE YOU GOT MONEY TO BUY MORE.: SOMETIMES TRUE

## 2025-02-10 SDOH — ECONOMIC STABILITY: TRANSPORTATION INSECURITY
IN THE PAST 12 MONTHS, HAS LACK OF TRANSPORTATION KEPT YOU FROM MEETINGS, WORK, OR FROM GETTING THINGS NEEDED FOR DAILY LIVING?: NO

## 2025-02-10 NOTE — PROGRESS NOTES
Iqra Dunn is a 29 y.o.  at 16w3d    Patient states she is doing well and is here for an early glucola.    Patient reports Negative FM, denies vaginal bleeding, LOF, and contractions. She also denies Headaches, Scotoma, RUQ pain, and Edema.       1. Have you been to the ER, urgent care clinic, or hospitalized since your last visit? No  2. Have you seen or consulted any other health care providers outside of the [facility] since your last visit? No      She declines  a chaperone during the gynecologic exam today.

## 2025-02-11 ENCOUNTER — ROUTINE PRENATAL (OUTPATIENT)
Age: 30
End: 2025-02-11

## 2025-02-11 VITALS
WEIGHT: 197.8 LBS | TEMPERATURE: 97.8 F | RESPIRATION RATE: 16 BRPM | OXYGEN SATURATION: 98 % | BODY MASS INDEX: 32.92 KG/M2 | SYSTOLIC BLOOD PRESSURE: 115 MMHG | DIASTOLIC BLOOD PRESSURE: 78 MMHG | HEART RATE: 88 BPM

## 2025-02-11 DIAGNOSIS — Z34.90 PREGNANCY, UNSPECIFIED GESTATIONAL AGE: Primary | ICD-10-CM

## 2025-02-11 LAB — GLUCOSE 1H P 100 G GLC PO SERPL-MCNC: 139 MG/DL (ref 65–140)

## 2025-02-11 PROCEDURE — 0502F SUBSEQUENT PRENATAL CARE: CPT | Performed by: OBSTETRICS & GYNECOLOGY

## 2025-02-11 NOTE — PROGRESS NOTES
Subjective: Doing well. No FM yet. Denies VB, LOF. Headaches improving on magnesium.    /78 (Site: Right Upper Arm, Position: Sitting, Cuff Size: Large Adult)   Pulse 88   Temp 97.8 °F (36.6 °C) (Oral)   Resp 16   Wt 89.7 kg (197 lb 12.8 oz)   LMP 10/26/2024   SpO2 98%   BMI 32.92 kg/m²     Prenatal Fetal Information  Fetal HR: VScan  Movement: Absent    A/P  Iqra Dunn is a 29 y.o.  at 16w3d with pregnancy complicated by:   - Early glucola performed today given twin pregnancy, BMI 32  - On baby aspirin    Patient Active Problem List    Diagnosis Date Noted    Dichorionic diamniotic twin pregnancy in first trimester 2025    History of pre-eclampsia in prior pregnancy, currently pregnant 2025    History of  delivery 2025    Pregnancy 2025     Primary Provider: Jayjay   (C/s x1 at 29 weeks due to preE with severe features)  EDC by FTUS    Pregnancy problems:  Di/Di twin pregnancy  - Follow up growth scans  - Delivery between 37/0-38/6    Obesity, pre preg BMI 33    History of pre-eclampsia with severe features, 29 week delivery  - Baby aspirin this pregnancy  - Baseline pre-E labs: Cr 0.77, ALT 20, AST 10, 11.6/35.5, UPC 0.1    Routine OB:  - PNLs: O+/absc neg, Hgb A2', R imm, VZV NI, RPR NR, Hep B Neg, Hep C NR, HIV NR, G/C/T neg, pap , urine cx NG  - Genetic Screening: NIPT low risk: girl/boy  - Anatomy scan:  - Vaccines: [ ] Flu  [ ] TDAP [ ] RSV  - 1 hr GTT:   - Third Tri Labs:  - [ ] GBS  - [ ] Fetal presentation    Pain mgmt. 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 Partner Cathy works in maintenance. Daughter Marisela.          Marylu Ro MD

## 2025-03-18 DIAGNOSIS — Z34.90 PREGNANCY, UNSPECIFIED GESTATIONAL AGE: Primary | ICD-10-CM

## 2025-03-19 ENCOUNTER — ROUTINE PRENATAL (OUTPATIENT)
Age: 30
End: 2025-03-19

## 2025-03-19 ENCOUNTER — ROUTINE PRENATAL (OUTPATIENT)
Age: 30
End: 2025-03-19
Payer: COMMERCIAL

## 2025-03-19 VITALS
TEMPERATURE: 98.5 F | OXYGEN SATURATION: 97 % | SYSTOLIC BLOOD PRESSURE: 119 MMHG | RESPIRATION RATE: 16 BRPM | HEART RATE: 96 BPM | DIASTOLIC BLOOD PRESSURE: 79 MMHG | WEIGHT: 204.4 LBS | BODY MASS INDEX: 34.01 KG/M2

## 2025-03-19 VITALS — SYSTOLIC BLOOD PRESSURE: 99 MMHG | DIASTOLIC BLOOD PRESSURE: 61 MMHG | HEART RATE: 93 BPM

## 2025-03-19 DIAGNOSIS — Z34.90 PREGNANCY, UNSPECIFIED GESTATIONAL AGE: ICD-10-CM

## 2025-03-19 DIAGNOSIS — Z34.90 PREGNANCY, UNSPECIFIED GESTATIONAL AGE: Primary | ICD-10-CM

## 2025-03-19 PROCEDURE — 76816 OB US FOLLOW-UP PER FETUS: CPT | Performed by: OBSTETRICS & GYNECOLOGY

## 2025-03-19 PROCEDURE — 76817 TRANSVAGINAL US OBSTETRIC: CPT | Performed by: OBSTETRICS & GYNECOLOGY

## 2025-03-19 PROCEDURE — 0502F SUBSEQUENT PRENATAL CARE: CPT | Performed by: OBSTETRICS & GYNECOLOGY

## 2025-03-19 PROCEDURE — 99213 OFFICE O/P EST LOW 20 MIN: CPT | Performed by: OBSTETRICS & GYNECOLOGY

## 2025-03-19 SDOH — ECONOMIC STABILITY: FOOD INSECURITY: WITHIN THE PAST 12 MONTHS, THE FOOD YOU BOUGHT JUST DIDN'T LAST AND YOU DIDN'T HAVE MONEY TO GET MORE.: NEVER TRUE

## 2025-03-19 SDOH — ECONOMIC STABILITY: FOOD INSECURITY: WITHIN THE PAST 12 MONTHS, YOU WORRIED THAT YOUR FOOD WOULD RUN OUT BEFORE YOU GOT MONEY TO BUY MORE.: NEVER TRUE

## 2025-03-19 SDOH — ECONOMIC STABILITY: INCOME INSECURITY: HOW HARD IS IT FOR YOU TO PAY FOR THE VERY BASICS LIKE FOOD, HOUSING, MEDICAL CARE, AND HEATING?: PATIENT DECLINED

## 2025-03-19 NOTE — PROCEDURES
PATIENT: SAMIRA NUNEZ   -  : 1995   -  DOS:2025   -  INTERPRETING PROVIDER:Juan Rhoades,   Indication  ========    Anatomy, dichorionic/diamniotic pregnancy, hx pre-eclampsia    Method  ======    Transabdominal ultrasound examination. View: suboptimal due to unfavorable fetal position    Dating  ======    LMP on: 10/26/2024  GA by LMP 20 w + 4 d  FLORENTINO by LMP: 2025  Prior assessment on: 2024  Prior assessment by: From outside records  GA by prior assessment 21 w + 4 d  FLORENTINO by prior assessment: 2025  Ultrasound examination on: 3/19/2025  GA by U/S based upon: AC, BPD, Femur, HC  GA by U/S 20 w + 4 d  FLORENTINO by U/S: 2025  GA by U/S based upon (Fetus 2): AC, BPD, Femur, HC  GA by U/S (Fetus 2) 21 w + 0 d  FLORENTINO by U/S (Fetus 2): 2025  Assigned: based on stated FLORENTINO (on 2024, From outside records), selected on 2025  Assigned GA 21 w + 4 d  Assigned FLORENTINO: 2025    Fetus 1: Fetal Growth Overview  =================    Exam date        GA              BPD (mm)         HC (mm)            AC (mm)              FL (mm)             HL (mm)             EFW (g)  2025        21w 4d        46.9     6%        178.7    4%        160.3     29%        34.3    17%        33.2     35%        384    15%    Fetus 1: Fetal Biometry  ============    Standard  BPD 46.9 mm 20w 1d 6% Hadlock  OFD 64.3 mm 21w 6d 59% Candelario  .7 mm 20w 2d 4% Hadlock  Cerebellum tr 22.8 mm 21w 1d 56% Hill  Nuchal fold 3.1 mm  .3 mm 21w 1d 29% Hadlock  Femur 34.3 mm 20w 6d 17% Hadlock  Humerus 33.2 mm 21w 2d 35% Candelario   g 20w 5d 15% Hadlock  EFW discordance 2.1 %  EFW (lb) 0 lb  EFW (oz) 14 oz  EFW by: Hadlock (BPD-HC-AC-FL)  Extended   4.5 mm  CM 6.1 mm  72% Nicolaides  Head / Face / Neck  Nasal bone: present  Other Structures   bpm    Fetus 1: General Evaluation  ==============    Cardiac activity present.  bpm. Fetal movements: visualized. Presentation: BREECH, maternal

## 2025-03-19 NOTE — PROGRESS NOTES
Patient was seen 3/19/2025      Please look under media to view full consult and ultrasound report in ViewPoint.         Juan Rhoades MD  Maternal Fetal Medicine

## 2025-03-19 NOTE — PROGRESS NOTES
Iqra Dunn is a 29 y.o.  at 21w4d      Patient states she is doing well and had MFM appointment this afternoon.    Patient reports Positive FM.      1. Have you been to the ER, urgent care clinic, or hospitalized since your last visit? No  2. Have you seen or consulted any other health care providers outside of the [facility] since your last visit? No      She declines  a chaperone during the gynecologic exam today.

## 2025-03-19 NOTE — PROGRESS NOTES
Subjective: Doing well. Good FM from B- doesn't feel as much from A. Denies VB, LOF.    /79 (BP Site: Right Upper Arm, Patient Position: Sitting, BP Cuff Size: Large Adult)   Pulse 96   Temp 98.5 °F (36.9 °C) (Oral)   Resp 16   Wt 92.7 kg (204 lb 6.4 oz)   LMP 10/26/2024   SpO2 97%   BMI 34.01 kg/m²     Prenatal Fetal Information  Fetal HR: MFM  Movement: Present    A/P  Iqra Dunn is a 29 y.o.  at 21w4d with pregnancy complicated by:   - Di/Di twins. Currently breech/transverse  - Hx of pre-eclampisa  - Answered questions about possible c/s vs TOLAC. Discussed fetal presentation will be the most important factor at time of delivery  - Glucola at next visit    Patient Active Problem List    Diagnosis Date Noted    Dichorionic diamniotic twin pregnancy in first trimester 2025    History of pre-eclampsia in prior pregnancy, currently pregnant 2025    History of  delivery 2025    Pregnancy 2025     Primary Provider: Jayjay   (C/s x1 at 29 weeks due to preE with severe features)  EDC by FT[facility]    Pregnancy problems:  Di/Di twin pregnancy  - Follow up growth scans  - Delivery between 37/0-38/6    Obesity, pre preg BMI 33    History of pre-eclampsia with severe features, 29 week delivery  - Baby aspirin this pregnancy  - Baseline pre-E labs: Cr 0.77, ALT 20, AST 10, 11.6/35.5, UPC 0.1    Routine OB:  - PNLs: O+/absc neg, Hgb A2', R imm, VZV NI, RPR NR, Hep B Neg, Hep C NR, HIV NR, G/C/T neg, pap , urine cx NG  - Genetic Screening: NIPT low risk: girl/boy  - Anatomy scan:  - Vaccines: [ ] Flu  [ ] TDAP [ ] RSV  - 1 hr GTT:   - Third Tri Labs:  - [ ] GBS  - [ ] Fetal presentation    Pain mgmt. 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 Daughter Marisela.            Marylu Ro MD

## 2025-03-31 ENCOUNTER — TELEPHONE (OUTPATIENT)
Age: 30
End: 2025-03-31

## 2025-03-31 NOTE — TELEPHONE ENCOUNTER
Patient called, name and  verified. Patient is calling c/o a pain that shoots down her left leg. This started over the weekend. She reports that she did not over do it this weekend and is unsure what brought on this sharp pain.    She reports the pain is mostly when she is walking, every time (not just sporadic). She denies any bleeding and reports good FM.    She has been taking Tylenol but reports that this does not help.    She just got a belyy band and hopes that this will help. Please advise of any recommendations. She is currently a  and 23w2d. Age is 29. Pt denies any additional sx.

## 2025-04-08 ENCOUNTER — LAB (OUTPATIENT)
Age: 30
End: 2025-04-08

## 2025-04-08 ENCOUNTER — ROUTINE PRENATAL (OUTPATIENT)
Age: 30
End: 2025-04-08

## 2025-04-08 VITALS
OXYGEN SATURATION: 99 % | BODY MASS INDEX: 34.58 KG/M2 | WEIGHT: 207.8 LBS | DIASTOLIC BLOOD PRESSURE: 81 MMHG | RESPIRATION RATE: 18 BRPM | SYSTOLIC BLOOD PRESSURE: 116 MMHG | HEART RATE: 91 BPM | TEMPERATURE: 97.9 F

## 2025-04-08 DIAGNOSIS — Z34.90 PREGNANCY, UNSPECIFIED GESTATIONAL AGE: ICD-10-CM

## 2025-04-08 DIAGNOSIS — Z34.90 PREGNANCY, UNSPECIFIED GESTATIONAL AGE: Primary | ICD-10-CM

## 2025-04-08 PROCEDURE — 0502F SUBSEQUENT PRENATAL CARE: CPT | Performed by: OBSTETRICS & GYNECOLOGY

## 2025-04-08 NOTE — PROGRESS NOTES
Subjective: Doing well. Good FM x2. Denies VB, LOF. Leg pain significantly better with use of belly band    /81 (BP Site: Right Upper Arm, Patient Position: Sitting, BP Cuff Size: Large Adult)   Pulse 91   Temp 97.9 °F (36.6 °C) (Oral)   Resp 18   Wt 94.3 kg (207 lb 12.8 oz)   LMP 10/26/2024   SpO2 99%   BMI 34.58 kg/m²     Prenatal Fetal Information  Fetal HR: 166 and 140s  Movement: Present    A/P  Iqra Dunn is a 29 y.o.  at 24w3d with pregnancy complicated by:     Patient Active Problem List    Diagnosis Date Noted    Dichorionic diamniotic twin pregnancy in first trimester 2025    History of pre-eclampsia in prior pregnancy, currently pregnant 2025    History of  delivery 2025    Pregnancy 2025     Primary Provider: Jayjay   (C/s x1 at 29 weeks due to preE with severe features)  EDC by [facility]    Pregnancy problems:  Di/Di twin pregnancy  - Follow up growth scans  - Delivery between 37/0-38/6    Obesity, pre preg BMI 33    History of pre-eclampsia with severe features, 29 week delivery  - Baby aspirin this pregnancy  - Baseline pre-E labs: Cr 0.77, ALT 20, AST 10, 11.6/35.5, UPC 0.1    Routine OB:  - PNLs: O+/absc neg, Hgb A2', R imm, VZV NI, RPR NR, Hep B Neg, Hep C NR, HIV NR, G/C/T neg, pap , urine cx NG  - Genetic Screening: NIPT low risk: girl/boy  - Anatomy scan:  - Vaccines: [ ] Flu  [ ] TDAP [ ] RSV  - 1 hr GTT:   - Third Tri Labs:  - [ ] GBS  - [ ] Fetal presentation    Pain mgmt. 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 Daughter Marisela.            Marylu Ro MD

## 2025-04-08 NOTE — PROGRESS NOTES
Iqra Dunn is a 29 y.o.  at 24w3d    Patient states she is doing well that her leg pain has improved. She also completed glucola today.      Patient reports Positive FM.      1. Have you been to the ER, urgent care clinic, or hospitalized since your last visit? No  2. Have you seen or consulted any other health care providers outside of the [facility] since your last visit? No      She declines  a chaperone during the gynecologic exam today.

## 2025-04-09 ENCOUNTER — RESULTS FOLLOW-UP (OUTPATIENT)
Age: 30
End: 2025-04-09

## 2025-04-09 DIAGNOSIS — Z34.90 PREGNANCY, UNSPECIFIED GESTATIONAL AGE: Primary | ICD-10-CM

## 2025-04-09 LAB
ERYTHROCYTE [DISTWIDTH] IN BLOOD BY AUTOMATED COUNT: 13.4 % (ref 11.5–14.5)
FERRITIN SERPL-MCNC: 8 NG/ML (ref 8–252)
GLUCOSE 1H P 100 G GLC PO SERPL-MCNC: 97 MG/DL (ref 65–140)
HCT VFR BLD AUTO: 34.1 % (ref 35–47)
HGB BLD-MCNC: 10.6 G/DL (ref 11.5–16)
MCH RBC QN AUTO: 26.3 PG (ref 26–34)
MCHC RBC AUTO-ENTMCNC: 31.1 G/DL (ref 30–36.5)
MCV RBC AUTO: 84.6 FL (ref 80–99)
NRBC # BLD: 0 K/UL (ref 0–0.01)
NRBC BLD-RTO: 0 PER 100 WBC
PLATELET # BLD AUTO: 247 K/UL (ref 150–400)
PMV BLD AUTO: 11.8 FL (ref 8.9–12.9)
RBC # BLD AUTO: 4.03 M/UL (ref 3.8–5.2)
T PALLIDUM AB SER QL IA: NON REACTIVE
WBC # BLD AUTO: 6.6 K/UL (ref 3.6–11)

## 2025-04-14 ENCOUNTER — ROUTINE PRENATAL (OUTPATIENT)
Age: 30
End: 2025-04-14
Payer: COMMERCIAL

## 2025-04-14 VITALS — HEART RATE: 81 BPM | SYSTOLIC BLOOD PRESSURE: 106 MMHG | OXYGEN SATURATION: 98 % | DIASTOLIC BLOOD PRESSURE: 69 MMHG

## 2025-04-14 DIAGNOSIS — Z34.90 PREGNANCY, UNSPECIFIED GESTATIONAL AGE: ICD-10-CM

## 2025-04-14 PROCEDURE — 76816 OB US FOLLOW-UP PER FETUS: CPT | Performed by: OBSTETRICS & GYNECOLOGY

## 2025-04-14 PROCEDURE — 76820 UMBILICAL ARTERY ECHO: CPT | Performed by: OBSTETRICS & GYNECOLOGY

## 2025-04-14 PROCEDURE — 99213 OFFICE O/P EST LOW 20 MIN: CPT | Performed by: OBSTETRICS & GYNECOLOGY

## 2025-04-14 NOTE — PROCEDURES
PATIENT: SAMIRA NUNEZ   -  : 1995   -  DOS:2025   -  INTERPRETING PROVIDER:Aston Eisenberg,   Indication  ========    dichorionic/diamniotic pregnancy, hx pre-eclampsia, growth, additional anatomy views    Method  ======    Transabdominal ultrasound examination. View: suboptimal due to unfavorable fetal position    Pregnancy  =========    twin pregnancy. Number of fetuses: 2. Dichorionic-diamniotic    Dating  ======    LMP on: 10/26/2024  GA by LMP 24 w + 2 d  FLORENTINO by LMP: 2025  Prior assessment on: 2024  Prior assessment by: From outside records  GA by prior assessment 25 w + 2 d  FLORENTINO by prior assessment: 2025  Ultrasound examination on: 2025  GA by U/S based upon: AC, BPD, Femur, HC  GA by U/S 24 w + 3 d  FLORENTINO by U/S: 2025  GA by U/S based upon (Fetus 2): AC, BPD, Femur, HC  GA by U/S (Fetus 2) 24 w + 2 d  FLORENTINO by U/S (Fetus 2): 2025  Assigned: based on stated FLORENTINO (on 2024, From outside records), selected on 2025  Assigned GA 25 w + 2 d  Assigned FLORENTINO: 2025    Fetus 1: Fetal Biometry  ============    Standard  BPD 60.3 mm 24w 4d 18% Hadlock  OFD 83.2 mm 27w 0d 92% Candelario  .4 mm 25w 2d 27% Hadlock  .6 mm 23w 6d 7% Hadlock  Femur 42.7 mm 24w 0d 7% Hadlock   g 23w 6d 6% Hadlock  EFW discordance 4.5 %  EFW (lb) 1 lb  EFW (oz) 7 oz  EFW by: Hadlock (BPD-HC-AC-FL)  Other Structures   bpm    Fetus 1: General Evaluation  ==============    Cardiac activity present.  bpm. Fetal movements: visualized. Presentation: cephalic, maternal right  Placenta: Placental site: anterior, appropriate distance from the internal os  Umbilical cord: Cord vessels: 3 vessel cord. Insertion site: normal insertion  Amniotic fluid: Amount of AF: normal. MVP 4.3 cm    Fetus 1: Fetal Anatomy  ===========    RVOT view: normal  LVOT view: normal  3-vessel view: normal  Heart / Thorax  Situs: situs solitus (normal)  Bicaval view: normal  Ductal arch

## 2025-04-25 ENCOUNTER — HOSPITAL ENCOUNTER (INPATIENT)
Facility: HOSPITAL | Age: 30
LOS: 1 days | Discharge: HOME | DRG: 566 | End: 2025-04-26
Attending: OBSTETRICS & GYNECOLOGY | Admitting: OBSTETRICS & GYNECOLOGY
Payer: COMMERCIAL

## 2025-04-25 ENCOUNTER — TELEPHONE (OUTPATIENT)
Age: 30
End: 2025-04-25

## 2025-04-25 PROBLEM — Z3A.26 26 WEEKS GESTATION OF PREGNANCY: Status: ACTIVE | Noted: 2025-04-25

## 2025-04-25 LAB
ABO + RH BLD: NORMAL
BASOPHILS # BLD: 0.01 K/UL (ref 0–0.1)
BASOPHILS NFR BLD: 0.1 % (ref 0–1)
BLOOD GROUP ANTIBODIES SERPL: NORMAL
DIFFERENTIAL METHOD BLD: ABNORMAL
EOSINOPHIL # BLD: 0.06 K/UL (ref 0–0.4)
EOSINOPHIL NFR BLD: 0.8 % (ref 0–7)
ERYTHROCYTE [DISTWIDTH] IN BLOOD BY AUTOMATED COUNT: 13 % (ref 11.5–14.5)
HCT VFR BLD AUTO: 30.5 % (ref 35–47)
HGB BLD-MCNC: 9.6 G/DL (ref 11.5–16)
IMM GRANULOCYTES # BLD AUTO: 0.07 K/UL (ref 0–0.04)
IMM GRANULOCYTES NFR BLD AUTO: 1 % (ref 0–0.5)
LYMPHOCYTES # BLD: 1.41 K/UL (ref 0.8–3.5)
LYMPHOCYTES NFR BLD: 19.5 % (ref 12–49)
MCH RBC QN AUTO: 25.9 PG (ref 26–34)
MCHC RBC AUTO-ENTMCNC: 31.5 G/DL (ref 30–36.5)
MCV RBC AUTO: 82.2 FL (ref 80–99)
MONOCYTES # BLD: 0.9 K/UL (ref 0–1)
MONOCYTES NFR BLD: 12.4 % (ref 5–13)
NEUTS SEG # BLD: 4.79 K/UL (ref 1.8–8)
NEUTS SEG NFR BLD: 66.2 % (ref 32–75)
NRBC # BLD: 0 K/UL (ref 0–0.01)
NRBC BLD-RTO: 0 PER 100 WBC
PLATELET # BLD AUTO: 224 K/UL (ref 150–400)
PMV BLD AUTO: 11.8 FL (ref 8.9–12.9)
RBC # BLD AUTO: 3.71 M/UL (ref 3.8–5.2)
SPECIMEN EXP DATE BLD: NORMAL
WBC # BLD AUTO: 7.2 K/UL (ref 3.6–11)

## 2025-04-25 PROCEDURE — 86850 RBC ANTIBODY SCREEN: CPT

## 2025-04-25 PROCEDURE — 6370000000 HC RX 637 (ALT 250 FOR IP): Performed by: OBSTETRICS & GYNECOLOGY

## 2025-04-25 PROCEDURE — 86901 BLOOD TYPING SEROLOGIC RH(D): CPT

## 2025-04-25 PROCEDURE — 6360000002 HC RX W HCPCS: Performed by: OBSTETRICS & GYNECOLOGY

## 2025-04-25 PROCEDURE — G0378 HOSPITAL OBSERVATION PER HR: HCPCS

## 2025-04-25 PROCEDURE — 99282 EMERGENCY DEPT VISIT SF MDM: CPT

## 2025-04-25 PROCEDURE — 86900 BLOOD TYPING SEROLOGIC ABO: CPT

## 2025-04-25 PROCEDURE — 4500000002 HC ER NO CHARGE

## 2025-04-25 PROCEDURE — 1100000000 HC RM PRIVATE

## 2025-04-25 PROCEDURE — 85025 COMPLETE CBC W/AUTO DIFF WBC: CPT

## 2025-04-25 RX ORDER — SODIUM CHLORIDE, SODIUM LACTATE, POTASSIUM CHLORIDE, AND CALCIUM CHLORIDE .6; .31; .03; .02 G/100ML; G/100ML; G/100ML; G/100ML
500 INJECTION, SOLUTION INTRAVENOUS PRN
Status: DISCONTINUED | OUTPATIENT
Start: 2025-04-25 | End: 2025-04-26 | Stop reason: HOSPADM

## 2025-04-25 RX ORDER — BETAMETHASONE SODIUM PHOSPHATE AND BETAMETHASONE ACETATE 3; 3 MG/ML; MG/ML
12 INJECTION, SUSPENSION INTRA-ARTICULAR; INTRALESIONAL; INTRAMUSCULAR; SOFT TISSUE EVERY 24 HOURS
Status: DISCONTINUED | OUTPATIENT
Start: 2025-04-25 | End: 2025-04-26 | Stop reason: HOSPADM

## 2025-04-25 RX ORDER — SODIUM CHLORIDE 0.9 % (FLUSH) 0.9 %
5-40 SYRINGE (ML) INJECTION PRN
Status: DISCONTINUED | OUTPATIENT
Start: 2025-04-25 | End: 2025-04-26 | Stop reason: HOSPADM

## 2025-04-25 RX ORDER — CETIRIZINE HYDROCHLORIDE 10 MG/1
10 TABLET ORAL NIGHTLY
Status: DISCONTINUED | OUTPATIENT
Start: 2025-04-25 | End: 2025-04-26 | Stop reason: HOSPADM

## 2025-04-25 RX ORDER — SODIUM CHLORIDE 9 MG/ML
25 INJECTION, SOLUTION INTRAVENOUS PRN
Status: DISCONTINUED | OUTPATIENT
Start: 2025-04-25 | End: 2025-04-26 | Stop reason: HOSPADM

## 2025-04-25 RX ORDER — ACETAMINOPHEN 650 MG/1
650 SUPPOSITORY RECTAL EVERY 4 HOURS PRN
Status: DISCONTINUED | OUTPATIENT
Start: 2025-04-25 | End: 2025-04-26 | Stop reason: HOSPADM

## 2025-04-25 RX ORDER — SWAB
1 SWAB, NON-MEDICATED MISCELLANEOUS NIGHTLY
Status: DISCONTINUED | OUTPATIENT
Start: 2025-04-25 | End: 2025-04-26 | Stop reason: HOSPADM

## 2025-04-25 RX ORDER — ONDANSETRON 2 MG/ML
4 INJECTION INTRAMUSCULAR; INTRAVENOUS EVERY 6 HOURS PRN
Status: DISCONTINUED | OUTPATIENT
Start: 2025-04-25 | End: 2025-04-26 | Stop reason: HOSPADM

## 2025-04-25 RX ORDER — ONDANSETRON 4 MG/1
4 TABLET, ORALLY DISINTEGRATING ORAL EVERY 8 HOURS PRN
Status: DISCONTINUED | OUTPATIENT
Start: 2025-04-25 | End: 2025-04-26 | Stop reason: HOSPADM

## 2025-04-25 RX ORDER — ASPIRIN 81 MG/1
81 TABLET, CHEWABLE ORAL DAILY
Status: DISCONTINUED | OUTPATIENT
Start: 2025-04-26 | End: 2025-04-26 | Stop reason: HOSPADM

## 2025-04-25 RX ORDER — ACETAMINOPHEN 325 MG/1
650 TABLET ORAL EVERY 4 HOURS PRN
Status: DISCONTINUED | OUTPATIENT
Start: 2025-04-25 | End: 2025-04-26 | Stop reason: HOSPADM

## 2025-04-25 RX ORDER — SODIUM CHLORIDE 0.9 % (FLUSH) 0.9 %
5-40 SYRINGE (ML) INJECTION EVERY 12 HOURS SCHEDULED
Status: DISCONTINUED | OUTPATIENT
Start: 2025-04-25 | End: 2025-04-26 | Stop reason: HOSPADM

## 2025-04-25 RX ADMIN — CETIRIZINE HYDROCHLORIDE 10 MG: 10 TABLET, FILM COATED ORAL at 22:20

## 2025-04-25 RX ADMIN — Medication 1 TABLET: at 22:20

## 2025-04-25 RX ADMIN — BETAMETHASONE SODIUM PHOSPHATE AND BETAMETHASONE ACETATE 12 MG: 3; 3 INJECTION, SUSPENSION INTRA-ARTICULAR; INTRALESIONAL; INTRAMUSCULAR at 20:21

## 2025-04-25 NOTE — ED NOTES
29 yo  @ 26w6d who came in because she has a h/o severe pre-e with delivery at 29 weeks and she had a headache yesterday and this morning. That has resolved but when she called the office they advised her to come in so she did. She reports normal fetal movement.    Primary Provider: Jayjay STOVER0101 (C/s x1 at 29 weeks due to preE with severe features)  EDC by FT[facility]    Pregnancy problems:  Di/Di twin pregnancy  - Follow up growth scans: FGR x2  - Delivery between 37/0-38/6    History of c/s x1 at 29 weeks    Obesity, pre preg BMI 33    History of pre-eclampsia with severe features, 29 week delivery  - Baby aspirin this pregnancy  - Baseline pre-E labs: Cr 0.77, ALT 20, AST 10, 11.6/35.5, UPC 0.1    Routine OB:  - PNLs: O+/absc neg, Hgb A2', R imm, VZV NI, RPR NR, Hep B Neg, Hep C NR, HIV NR, G/C/T neg, pap , urine cx NG  - Genetic Screening: NIPT low risk: girl/boy  - Anatomy scan: Fetus A: BREECH, maternal right presentation. EFW is 384 g at 15% and AC at 29%. Anterior placenta  Fetus B: Transverse, head to maternal right. EFW is 392 g at 19% and AC at 25%. Anterior placenta  EFW discordance at 2.1   - Vaccines: [ ] Flu  [ ] TDAP [ ] RSV  - 1 hr GTT: 97  - Third Tri Labs: 10.6/34.1/247  - [ ] GBS  - [ ] Fetal presentation    Pain mgmt. 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 Partner Cathy works in maintenance. Daughter Marisela.      The history is provided by the patient.        No chief complaint on file.      Past Medical History:   Diagnosis Date    Hypertension 2023    Pre-eclampsia 3023       Past Surgical History:   Procedure Laterality Date     SECTION  2023         Family History   Problem Relation Age of Onset    Diabetes Mother     Cancer Father         lung       Social History     Socioeconomic History    Marital status: Single     Spouse name: Not on file    Number of children: Not on file    Years of education: Not on file

## 2025-04-25 NOTE — PROGRESS NOTES
: , 26.6 wks, di/di twins, pt of Dr Ro, arrived ambulatory to [facility] for a HA that she experienced this morning but resolved without medication and is no longer bothering her. Pt also experienced a single episode of vomiting yesterday afternoon around 1630, during this time pt states she saw specs in her vision and experienced a HA. These resolved yesterday without treatment as well. Pt states she's feeling good FM, denies ctxs, LOF, or VB.     : Dr Christy in room to evaluate pt.

## 2025-04-25 NOTE — H&P
Department of Obstetrics and Gynecology  Attending Obstetrics History and Physical        CHIEF COMPLAINT:  fetal deceleration    HISTORY OF PRESENT ILLNESS:      The patient is a 30 y.o.  @ 26w6d with di/di twins who is being admitted from [facility] for prolonged monitoring and BMTZ after a prolonged deceleration    FGR  Twin A 6 % Twin B 10% ( initial 15%, 19% ) on 4/15    Primary Provider: Jayjay YU (C/s x1 at 29 weeks due to preE with severe features)  EDC by [facility]     Pregnancy problems:  Di/Di twin pregnancy  - Follow up growth scans: FGR x2  - Delivery between 37/0-38/6     History of c/s x1 at 29 weeks     Obesity, pre preg BMI 33     History of pre-eclampsia with severe features, 29 week delivery  - Baby aspirin this pregnancy  - Baseline pre-E labs: Cr 0.77, ALT 20, AST 10, 11.6/35.5, UPC 0.1     Routine OB:  - PNLs: O+/absc neg, Hgb A2', R imm, VZV NI, RPR NR, Hep B Neg, Hep C NR, HIV NR, G/C/T neg, pap , urine cx NG  - Genetic Screening: NIPT low risk: girl/boy  - Anatomy scan: Fetus A: BREECH, maternal right presentation. EFW is 384 g at 15% and AC at 29%. Anterior placenta  Fetus B: Transverse, head to maternal right. EFW is 392 g at 19% and AC at 25%. Anterior placenta  EFW discordance at 2.1   - Vaccines: [ ] Flu  [ ] TDAP [ ] RSV  - 1 hr GTT: 97  - Third Tri Labs: 10.6/34.1/247  - [ ] GBS  - [ ] Fetal presentation     Pain mgmt. 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 Daughter Marisela.      OB History    Para Term  AB Living   2 1  1  1   SAB IAB Ectopic Molar Multiple Live Births        1      # Outcome Date GA Lbr Tam/2nd Weight Sex Type Anes PTL Lv   2 Current            1  23 29w0d   F CS-LTranv Spinal  LAZARUS      Complications: Pre-eclampsia       Past Medical History:        Diagnosis Date    Hypertension 2023    Pre-eclampsia 3023     Past Surgical History:

## 2025-04-25 NOTE — TELEPHONE ENCOUNTER
Pt called after hours line and was called back. She is 26 wks pregnant and started to have headaches and nausea. Referred to L+D for evaluation.    Cisco Brunner MD

## 2025-04-25 NOTE — PROGRESS NOTES
1916: RN assumed care of patient, admitted for extended monitoring after decel of baby A in [facility]. Dr. Christy at bedside, plan to monitor overnight and give betamethasone. Pt agreeable w/ plan of care.  2021: First dose of betamethasone given (see MAR).  2119: Pt transferred to room 7. L side.   2230: Verbal/bedside report given to DULCE Ureña RN.

## 2025-04-25 NOTE — TELEPHONE ENCOUNTER
Name and  verified. Patient called regarding [employer] message.    Iqra Dunn is a 30 y.o.  at 26w6d with Di/Di twins. States she called and spoke with overnight provider last night  due to headaches and N/V. She was advised to go to L&D but she didn't go because her home BP was fine 128/90.     Patient is complaining of Headaches, off and on Scotoma, and new increased Edema. Patient reports Positive FM and increased vaginal discharge. She denies vaginal bleeding, LOF, and contractions and RUQ pain.     Patient did have nausea and vomiting but stated it decreased overnight. She is able to take blood pressure at home but recently took it at work 128/82.     She has a history of severe pre-eclampsia delivery at 29w.     Patient advised to go to labor and delivery for evaluation given symptoms still ongoing.      
no

## 2025-04-26 ENCOUNTER — HOSPITAL ENCOUNTER (OUTPATIENT)
Facility: HOSPITAL | Age: 30
Discharge: HOME | DRG: 566 | End: 2025-04-26
Attending: OBSTETRICS & GYNECOLOGY | Admitting: OBSTETRICS & GYNECOLOGY
Payer: COMMERCIAL

## 2025-04-26 VITALS
TEMPERATURE: 98.2 F | HEART RATE: 81 BPM | OXYGEN SATURATION: 99 % | DIASTOLIC BLOOD PRESSURE: 65 MMHG | SYSTOLIC BLOOD PRESSURE: 121 MMHG | RESPIRATION RATE: 16 BRPM

## 2025-04-26 PROCEDURE — 6360000002 HC RX W HCPCS: Performed by: OBSTETRICS & GYNECOLOGY

## 2025-04-26 PROCEDURE — G0378 HOSPITAL OBSERVATION PER HR: HCPCS

## 2025-04-26 PROCEDURE — 96372 THER/PROPH/DIAG INJ SC/IM: CPT

## 2025-04-26 RX ORDER — BETAMETHASONE SODIUM PHOSPHATE AND BETAMETHASONE ACETATE 3; 3 MG/ML; MG/ML
12 INJECTION, SUSPENSION INTRA-ARTICULAR; INTRALESIONAL; INTRAMUSCULAR; SOFT TISSUE ONCE
Status: CANCELLED | OUTPATIENT
Start: 2025-04-26

## 2025-04-26 RX ORDER — BETAMETHASONE SODIUM PHOSPHATE AND BETAMETHASONE ACETATE 3; 3 MG/ML; MG/ML
12 INJECTION, SUSPENSION INTRA-ARTICULAR; INTRALESIONAL; INTRAMUSCULAR; SOFT TISSUE ONCE
Status: DISCONTINUED | OUTPATIENT
Start: 2025-04-26 | End: 2025-04-26

## 2025-04-26 RX ORDER — BETAMETHASONE SODIUM PHOSPHATE AND BETAMETHASONE ACETATE 3; 3 MG/ML; MG/ML
12 INJECTION, SUSPENSION INTRA-ARTICULAR; INTRALESIONAL; INTRAMUSCULAR; SOFT TISSUE ONCE
Status: COMPLETED | OUTPATIENT
Start: 2025-04-26 | End: 2025-04-26

## 2025-04-26 RX ADMIN — BETAMETHASONE SODIUM PHOSPHATE AND BETAMETHASONE ACETATE 12 MG: 3; 3 INJECTION, SUSPENSION INTRA-ARTICULAR; INTRALESIONAL; INTRAMUSCULAR at 20:28

## 2025-04-26 NOTE — DISCHARGE INSTRUCTIONS
Pregnancy Precautions: Care Instructions  Overview     There is no sure way to prevent labor before your due date ( labor) or to prevent most other pregnancy problems. But there are things you can do to increase your chances of a healthy pregnancy. Go to your appointments, follow your doctor's advice, and take good care of yourself. Eat healthy foods, and exercise (if your doctor agrees). And make sure to drink plenty of water.  Follow-up care is a key part of your treatment and safety. Be sure to make and go to all appointments, and call your doctor if you are having problems. It's also a good idea to know your test results and keep a list of the medicines you take.  How can you care for yourself at home?  Make sure you go to your prenatal appointments. At each visit, your doctor will check your blood pressure and weight. Your doctor will also listen for a fetal heartbeat and measure the size of the uterus.  Drink plenty of fluids. Dehydration can cause contractions. If you have kidney, heart, or liver disease and have to limit fluids, talk with your doctor before you increase the amount of fluids you drink.  Tell your doctor right away if you notice any symptoms of an infection, such as:  Burning when you urinate.  A frequent need to urinate without being able to pass much urine.  A foul-smelling discharge from your vagina.  Vaginal itching.  Unexplained fever.  Unusual pain or soreness in your uterus or lower belly.  Avoid foods that may be harmful.  Don't eat raw meat, deli meat, raw seafood, or raw eggs.  Avoid soft cheese and unpasteurized dairy, like Brie and blue cheese.  Avoid fish that are high in mercury. These include shark, swordfish, oz mackerel, marlin, orange roughy, and bigeye tuna, as well as tilefish from the [place].  If you smoke or vape, quit or cut back as much as you can. Talk to your doctor if you need help quitting.  If you use alcohol, marijuana, or other drugs, quit  you're carrying twins, you and your babies may be tested and checked more than you would for a single-baby pregnancy.  At about 10 weeks, an ultrasound can show if the babies are growing in the same amniotic sac. If they each have their own sac and their own placenta, twins have the best chances of both growing well.  Between weeks 18 and 20, an ultrasound may be able to show the sex of your babies.  Later in your pregnancy, you will start to have checkups more often. This will be sooner than for a single-baby pregnancy.  Twins tend to be born early, but 38 weeks is a goal when you and the babies are doing well.  As you get closer to delivery time, your medical team will help you know what to expect and what to do. As questions come to mind, keep a list so you can remember to ask your doctor.  Follow-up care is a key part of your treatment and safety. Be sure to make and go to all appointments, and call your doctor if you are having problems. It's also a good idea to know your test results and keep a list of the medicines you take.  Where can you learn more?  Go to [email or web address] and enter S938 to learn more about \"Learning About Twin Pregnancy.\"  Current as of: 2024  Content Version: 14.4   [employer].   Care instructions adapted under license by [employer]. If you have questions about a medical condition or this instruction, always ask your healthcare professional. [employer] disclaims any warranty or liability for your use of this information.       Weeks 26 to 30 of Your Pregnancy: Care Instructions  You're starting your last trimester. You'll probably feel your baby moving around more. Your back may ache as your body gets used to your baby's size and length. Take care of yourself, and pay attention to what your body needs.    Talk to your doctor about getting the Tdap shot. It will help protect your  against whooping cough (pertussis).

## 2025-04-26 NOTE — PROGRESS NOTES
High Risk Obstetrics Progress Note    Name: Iqra Dunn MRN: [id number]  SSN: [id number]    YOB: 1995  Age: 30 y.o.  Sex: female      Subjective:      LOS: 0 days    Estimated Date of Delivery: 25   Gestational Age Today: 27w0d     Patient admitted for admitted from [facility] for prolonged monitoring and BMTZ after a prolonged deceleration . States she does not have abdominal pain  , contractions, fever, nausea and vomiting, pelvic pressure, vaginal bleeding , and vaginal leaking of fluid .  AFM x 2  Objective:     Vitals:  Last menstrual period 10/26/2024.   Temp (24hrs), Av.6 °F (37 °C), Min:98.2 °F (36.8 °C), Max:98.8 °F (37.1 °C)    Systolic (24hrs), Av , Min:106 , Max:121      Diastolic (24hrs), Av, Min:54, Max:66       Intake and Output:         Physical Exam:  Patient without distress  Heart: Regular rate and rhythm  Lung: normal respiratory effort  Abdomen: soft, nontender  Fundus: firm and non tender  Lower Extremities:  - Edema No       Membranes:  Intact    Uterine Activity:  None    Fetal Heart Rate:  Reactive x 2  Baseline: 150 per minute Twin A , 145 Twin B  Variability: moderate x 2  Accelerations: yes x 2  Decelerations: none x 2  Uterine contractions: none        Labs:   Recent Results (from the past 36 hours)   CBC with Auto Differential    Collection Time: 25  8:27 PM   Result Value Ref Range    WBC 7.2 3.6 - 11.0 K/uL    RBC 3.71 (L) 3.80 - 5.20 M/uL    Hemoglobin 9.6 (L) 11.5 - 16.0 g/dL    Hematocrit 30.5 (L) 35.0 - 47.0 %    MCV 82.2 80.0 - 99.0 FL    MCH 25.9 (L) 26.0 - 34.0 PG    MCHC 31.5 30.0 - 36.5 g/dL    RDW 13.0 11.5 - 14.5 %    Platelets 224 150 - 400 K/uL    MPV 11.8 8.9 - 12.9 FL    Nucleated RBCs 0.0 0  WBC    nRBC 0.00 0.00 - 0.01 K/uL    Neutrophils % 66.2 32.0 - 75.0 %    Lymphocytes % 19.5 12.0 - 49.0 %    Monocytes % 12.4 5.0 - 13.0 %    Eosinophils % 0.8 0.0 - 7.0 %    Basophils % 0.1 0.0 - 1.0 %    Immature Granulocytes % 1.0

## 2025-04-26 NOTE — PROGRESS NOTES
0751 Bedside and Verbal shift change report given to GRETEL Del Angel RN (oncoming nurse) by ABA Ureña RN (offgoing nurse). Report included the following information Nurse Handoff Report, MAR, Recent Results, and Event Log. Pt is resting in bed with eyes closed  0905 Dr Roth at desk viewing monitor strip history and consulting with MARTÍN. Pt may come off monitor and will either be discharged home to return at 2000 for second betamethasone or will transfer to [facility] and discharge after betamethasone..   0945 Served general diet  1015 Dr Roth in to evaluate patient. Pt to be discharged home to return at 2000 for second betamethasone injection. Pt has an apt with MARTÍN on April 30 and will call on Monday and make an apt with Dr Ro for this week as well.  1045 Discharged home undelivered with discharge instructions. Will return at 2000 for second betamethasone injection

## 2025-04-26 NOTE — PROGRESS NOTES
2230 - report received at bedside from BRIEN Lares RN, all questions answered and care assumed at this time.    [id number] - report given and care relinquished to RANJIT Rosas RN, care reassumed at 0135.    0142 - additional RN requested to bedside to assist with adjusting USx2.    [id number] - RN at bedside continously adjusting EFMx2    0433 - additional RN at bedside to assess with USx2.    [id number] - RN at bedside continuously adjusting US    0735 - report given to GRETEL Del Angel RN at bedside, all questions answered and care relinquished at this time.

## 2025-04-27 NOTE — PROGRESS NOTES
4/26/2025  7:58 PM Patient arrived for second dose of beta.   2028: Second dose of beta given per MAR and patient discharged home.

## 2025-04-30 ENCOUNTER — ROUTINE PRENATAL (OUTPATIENT)
Age: 30
End: 2025-04-30
Payer: COMMERCIAL

## 2025-04-30 ENCOUNTER — ROUTINE PRENATAL (OUTPATIENT)
Age: 30
End: 2025-04-30

## 2025-04-30 ENCOUNTER — RESULTS FOLLOW-UP (OUTPATIENT)
Age: 30
End: 2025-04-30

## 2025-04-30 VITALS
DIASTOLIC BLOOD PRESSURE: 60 MMHG | TEMPERATURE: 98.5 F | RESPIRATION RATE: 17 BRPM | SYSTOLIC BLOOD PRESSURE: 110 MMHG | HEART RATE: 103 BPM | OXYGEN SATURATION: 97 % | BODY MASS INDEX: 34.65 KG/M2 | WEIGHT: 208.2 LBS

## 2025-04-30 VITALS — HEART RATE: 99 BPM | DIASTOLIC BLOOD PRESSURE: 65 MMHG | SYSTOLIC BLOOD PRESSURE: 104 MMHG

## 2025-04-30 DIAGNOSIS — Z34.90 PREGNANCY, UNSPECIFIED GESTATIONAL AGE: Primary | ICD-10-CM

## 2025-04-30 DIAGNOSIS — Z34.90 PREGNANCY, UNSPECIFIED GESTATIONAL AGE: ICD-10-CM

## 2025-04-30 PROCEDURE — 99213 OFFICE O/P EST LOW 20 MIN: CPT | Performed by: OBSTETRICS & GYNECOLOGY

## 2025-04-30 PROCEDURE — 76815 OB US LIMITED FETUS(S): CPT | Performed by: OBSTETRICS & GYNECOLOGY

## 2025-04-30 PROCEDURE — 76820 UMBILICAL ARTERY ECHO: CPT | Performed by: OBSTETRICS & GYNECOLOGY

## 2025-04-30 PROCEDURE — 0502F SUBSEQUENT PRENATAL CARE: CPT | Performed by: OBSTETRICS & GYNECOLOGY

## 2025-04-30 NOTE — PROGRESS NOTES
Iqra Dunn is a 30 y.o. female  presents for a problem visit.    Chief Complaint   Patient presents with    Follow-Up from Hospital         OB/GYN History   - LTCS x 1  Hx of STI - No  SA - Yes, Male      Patient's last menstrual period was 10/26/2024.  Menses: Absent  Contraception: None          The patient is here for follow-up after an emergency room visit on 25. She reports Positive FM, denies vaginal bleeding, LOF, and contractions and Headaches, Scotoma, RUQ pain, and Edema.       1. Have you been to the ER, urgent care clinic, or hospitalized since your last visit? Yes  2. Have you seen or consulted any other health care providers outside of the [facility] since your last visit? No      She declines  a chaperone during the gynecologic exam today.  
Transverse, head to maternal right. EFW is 392 g at 19% and AC at 25%. Anterior placenta  EFW discordance at 2.1   - Vaccines: [ ] Flu  [ ] TDAP [ ] RSV  - 1 hr GTT: 97  - Third Tri Labs: 10.6/34.1/247  - [ ] GBS  - [ ] Fetal presentation    Pain mgmt. in labor:  Feeding:  Circ:  Social: Iqra works at the  at the [employer]. Partner Cathy works in maintenance. Daughter Marisela.            Marylu Ro MD

## 2025-04-30 NOTE — PROCEDURES
PATIENT: SAMIRA NUNEZ   -  : 1995   -  DOS:2025   -  INTERPRETING PROVIDER:Aston Eisenberg,   Indication  ========    dichorionic/diamniotic pregnancy, hx pre-eclampsia, growth, additional anatomy views    Method  ======    Transabdominal ultrasound examination. View: Sufficient    Pregnancy  =========    twin pregnancy. Number of fetuses: 2. Dichorionic-diamniotic    Dating  ======    LMP on: 10/26/2024  GA by LMP 26 w + 4 d  FLORENTINO by LMP: 2025  Prior assessment on: 2024  Prior assessment by: From outside records  GA by prior assessment 27 w + 4 d  FLORENTINO by prior assessment: 2025  Assigned: based on stated FLORENTINO (on 2024, From outside records), selected on 2025  Assigned GA 27 w + 4 d  Assigned FLORENTINO: 2025    Fetus 1: General Evaluation  ==============    Cardiac activity present.  bpm. Fetal movements: visualized. Presentation: Transverse, head to maternal left  Placenta: Placental site: anterior, appropriate distance from the internal os  Umbilical cord: Cord vessels: 3 vessel cord. Insertion site: normal insertion  Amniotic fluid: Amount of AF: normal. MVP 3.9 cm    Fetus 1: Fetal Anatomy  ===========    Stomach: normal  Kidneys: normal  Bladder: normal  Cervical spine: NOT VISUALIZED  Thoracic spine: NOT VISUALIZED  Lumbar spine: NOT VISUALIZED  Sacral spine: NOT VISUALIZED  Rt arm: normal  Rt hand: NOT VISUALIZED  Rt fingers: NOT VISUALIZED  Lt hand: NOT VISUALIZED  Lt fingers: NOT VISUALIZED  Wants to know fetal sex: yes    Fetus 1: Fetal Doppler  ===========    Arterial  Umbilical artery: increased  Umbilical A sampling site: midcord  Umbilical A PI 1.58 >99% Ebbing  Umbilical A RI 0.81 98% Ruddy  Umbilical A PS -42.37 cm/s  Umbilical A ED -8.34 cm/s  Umbilical A EDF: positive  Umbilical A TAmax -21.53 cm/s  Umbilical A MD -8.16 cm/s  Umbilical A S / D 5.33 >99% Ruddy  Umbilical A  bpm    Fetus 2: General Evaluation  ==============    Cardiac

## 2025-05-05 ENCOUNTER — ROUTINE PRENATAL (OUTPATIENT)
Age: 30
End: 2025-05-05
Payer: COMMERCIAL

## 2025-05-05 VITALS — DIASTOLIC BLOOD PRESSURE: 56 MMHG | HEART RATE: 91 BPM | SYSTOLIC BLOOD PRESSURE: 106 MMHG

## 2025-05-05 DIAGNOSIS — Z34.90 PREGNANCY, UNSPECIFIED GESTATIONAL AGE: ICD-10-CM

## 2025-05-05 PROCEDURE — 99214 OFFICE O/P EST MOD 30 MIN: CPT | Performed by: STUDENT IN AN ORGANIZED HEALTH CARE EDUCATION/TRAINING PROGRAM

## 2025-05-05 PROCEDURE — 76818 FETAL BIOPHYS PROFILE W/NST: CPT | Performed by: STUDENT IN AN ORGANIZED HEALTH CARE EDUCATION/TRAINING PROGRAM

## 2025-05-05 PROCEDURE — 76819 FETAL BIOPHYS PROFIL W/O NST: CPT | Performed by: STUDENT IN AN ORGANIZED HEALTH CARE EDUCATION/TRAINING PROGRAM

## 2025-05-05 PROCEDURE — 76816 OB US FOLLOW-UP PER FETUS: CPT | Performed by: STUDENT IN AN ORGANIZED HEALTH CARE EDUCATION/TRAINING PROGRAM

## 2025-05-05 PROCEDURE — 76820 UMBILICAL ARTERY ECHO: CPT | Performed by: STUDENT IN AN ORGANIZED HEALTH CARE EDUCATION/TRAINING PROGRAM

## 2025-05-05 NOTE — PROGRESS NOTES
Patient does not report signs or symptoms of pre-eclampsia or labor.  She reports counting fetal movements daily and has noted adequate fetal movement counts at home. Advised patient to continue monitoring fetal movements at home and to report any concerns for pre-eclampsia or labor to her OB. Patient verbalized understanding. All patient questions were addressed.

## 2025-05-06 NOTE — PROGRESS NOTES
Patient was seen 5/6/2025      Please look under media to view full consult and ultrasound report in ViewPoint.         Christine England MD   Maternal Fetal Medicine

## 2025-05-06 NOTE — PROCEDURES
PATIENT: SAMIRA NUNEZ   -  : 1995   -  DOS:2025   -  INTERPRETING PROVIDER:Christine England,   Indication  ========    dichorionic/diamniotic pregnancy, hx pre-eclampsia, growth, additional anatomy views    Method  ======    External Fetal Monitor and Transabdominal ultrasound examination. View: suboptimal due to unfavorable fetal position    Pregnancy  =========    twin pregnancy. Number of fetuses: 2. Dichorionic-diamniotic    Dating  ======    LMP on: 10/26/2024  GA by LMP 27 w + 2 d  FLORENTINO by LMP: 2025  Prior assessment on: 2024  Prior assessment by: From outside records  GA by prior assessment 28 w + 2 d  FLORENTINO by prior assessment: 2025  Ultrasound examination on: 2025  GA by U/S based upon: AC, BPD, Femur, HC  GA by U/S 26 w + 5 d  FLORENTINO by U/S: 2025  GA by U/S based upon (Fetus 2): AC, BPD, Femur, HC  GA by U/S (Fetus 2) 27 w + 3 d  FLORENTINO by U/S (Fetus 2): 2025  Assigned: based on stated FLORENTINO (on 2024, From outside records), selected on 2025  Assigned GA 28 w + 2 d  Assigned FLORENTINO: 2025    Fetus 1: Fetal Biometry  ============    Standard  BPD 66.8 mm 26w 6d 6% Hadlock  OFD 89.9 mm 29w 0d 68% Candelario  .1 mm 27w 1d 3% Hadlock  .1 mm 26w 3d 5% Hadlock  Femur 48.8 mm 26w 3d 3% Hadlock   g 26w 2d 3% Hadlock  EFW discordance 12.8 %  EFW (lb) 2 lb  EFW (oz) 1 oz  EFW by: Hadlock (BPD-HC-AC-FL)  Other Structures   bpm    Fetus 1: General Evaluation  ==============    Cardiac activity present.  bpm. Fetal movements: visualized. Presentation: BREECH, maternal right  Placenta: Placental site: anterior, appropriate distance from the internal os  Umbilical cord: Cord vessels: 3 vessel cord. Insertion site: normal insertion  Amniotic fluid: Amount of AF: normal. MVP 3.9 cm    Fetus 1: Fetal Anatomy  ===========    Stomach: normal  Kidneys: normal  Bladder: normal  Cervical spine: NOT VISUALIZED  Thoracic spine: NOT VISUALIZED  Lumbar

## 2025-05-08 ENCOUNTER — ROUTINE PRENATAL (OUTPATIENT)
Age: 30
End: 2025-05-08
Payer: COMMERCIAL

## 2025-05-08 VITALS — DIASTOLIC BLOOD PRESSURE: 74 MMHG | HEART RATE: 96 BPM | SYSTOLIC BLOOD PRESSURE: 112 MMHG

## 2025-05-08 DIAGNOSIS — Z34.90 PREGNANCY, UNSPECIFIED GESTATIONAL AGE: ICD-10-CM

## 2025-05-08 DIAGNOSIS — Z98.891 HISTORY OF CESAREAN DELIVERY: ICD-10-CM

## 2025-05-08 DIAGNOSIS — O09.299 HISTORY OF PRE-ECLAMPSIA IN PRIOR PREGNANCY, CURRENTLY PREGNANT: ICD-10-CM

## 2025-05-08 DIAGNOSIS — O36.5990 FETAL GROWTH RESTRICTION ANTEPARTUM: Primary | ICD-10-CM

## 2025-05-08 DIAGNOSIS — O30.041 DICHORIONIC DIAMNIOTIC TWIN PREGNANCY IN FIRST TRIMESTER: ICD-10-CM

## 2025-05-08 PROCEDURE — 76820 UMBILICAL ARTERY ECHO: CPT | Performed by: STUDENT IN AN ORGANIZED HEALTH CARE EDUCATION/TRAINING PROGRAM

## 2025-05-08 PROCEDURE — 76819 FETAL BIOPHYS PROFIL W/O NST: CPT | Performed by: STUDENT IN AN ORGANIZED HEALTH CARE EDUCATION/TRAINING PROGRAM

## 2025-05-08 PROCEDURE — 99214 OFFICE O/P EST MOD 30 MIN: CPT | Performed by: STUDENT IN AN ORGANIZED HEALTH CARE EDUCATION/TRAINING PROGRAM

## 2025-05-08 NOTE — PROCEDURES
PATIENT: SAMIRA NUNEZ   -  : 1995   -  DOS:2025   -  INTERPRETING PROVIDER:Christine England,   Indication  ========    dichorionic/diamniotic pregnancy, hx pre-eclampsia, growth, additional anatomy views    Method  ======    Transabdominal ultrasound examination. View: Sufficient    Pregnancy  =========    twin pregnancy. Number of fetuses: 2. Dichorionic-diamniotic    Dating  ======    LMP on: 10/26/2024  GA by LMP 27 w + 5 d  FLORENTINO by LMP: 2025  Prior assessment on: 2024  Prior assessment by: From outside records  GA by prior assessment 28 w + 5 d  FLORENTINO by prior assessment: 2025  Assigned: based on stated FLORENTINO (on 2024, From outside records), selected on 2025  Assigned GA 28 w + 5 d  Assigned FLORENTINO: 2025    Fetus 1: General Evaluation  ==============    Cardiac activity present.  bpm. Fetal movements: visualized. Presentation: BREECH, maternal right  Placenta: Placental site: anterior, appropriate distance from the internal os  Umbilical cord: Cord vessels: 3 vessel cord. Insertion site: normal insertion    Fetus 1: Fetal Anatomy  ===========    Stomach: normal  Kidneys: normal  Bladder: normal  Cervical spine: NOT VISUALIZED  Thoracic spine: NOT VISUALIZED  Lumbar spine: NOT VISUALIZED  Sacral spine: NOT VISUALIZED  Rt arm: normal  Rt hand: NOT VISUALIZED  Rt fingers: NOT VISUALIZED  Lt hand: NOT VISUALIZED  Lt fingers: NOT VISUALIZED  Wants to know fetal sex: yes    Fetus 1: Amniotic Fluid Assessment  =====================    Amount of AF: normal  MVP 4.4 cm    Fetus 1: Biophysical Profile  ==============    2: Fetal breathing movements  2: Gross body movements  2: Fetal tone  2: Amniotic fluid volume   Biophysical profile score    Fetus 1: Fetal Doppler  ===========    Arterial  Umbilical artery: normal  Umbilical A sampling site: midcord  Umbilical A PI 1.28 96% Ebbing  Umbilical A RI 0.71 78% Ruddy  Umbilical A PS 39.02 cm/s 20% Ebbing  Umbilical A

## 2025-05-08 NOTE — PROGRESS NOTES
Patient was seen 5/8/2025      Please look under media to view full consult and ultrasound report in ViewPoint.         Christine England MD   Maternal Fetal Medicine

## 2025-05-11 ENCOUNTER — HOSPITAL ENCOUNTER (EMERGENCY)
Facility: HOSPITAL | Age: 30
Discharge: HOME | End: 2025-05-11
Payer: COMMERCIAL

## 2025-05-11 VITALS
DIASTOLIC BLOOD PRESSURE: 73 MMHG | TEMPERATURE: 98.1 F | OXYGEN SATURATION: 98 % | SYSTOLIC BLOOD PRESSURE: 118 MMHG | HEART RATE: 117 BPM

## 2025-05-11 LAB
ABO + RH BLD: NORMAL
ALBUMIN SERPL-MCNC: 2.6 G/DL (ref 3.5–5)
ALBUMIN/GLOB SERPL: 0.5 (ref 1.1–2.2)
ALP SERPL-CCNC: 187 U/L (ref 45–117)
ALT SERPL-CCNC: 16 U/L (ref 12–78)
ANION GAP SERPL CALC-SCNC: 7 MMOL/L (ref 2–12)
AST SERPL-CCNC: 20 U/L (ref 15–37)
BASOPHILS # BLD: 0.01 K/UL (ref 0–0.1)
BASOPHILS NFR BLD: 0.1 % (ref 0–1)
BILIRUB SERPL-MCNC: 0.9 MG/DL (ref 0.2–1)
BLOOD GROUP ANTIBODIES SERPL: NORMAL
BUN SERPL-MCNC: 8 MG/DL (ref 6–20)
BUN/CREAT SERPL: 11 (ref 12–20)
CALCIUM SERPL-MCNC: 9.6 MG/DL (ref 8.5–10.1)
CHLORIDE SERPL-SCNC: 105 MMOL/L (ref 97–108)
CO2 SERPL-SCNC: 23 MMOL/L (ref 21–32)
CREAT SERPL-MCNC: 0.73 MG/DL (ref 0.55–1.02)
CREAT UR-MCNC: 145 MG/DL
DIFFERENTIAL METHOD BLD: ABNORMAL
EOSINOPHIL # BLD: 0.02 K/UL (ref 0–0.4)
EOSINOPHIL NFR BLD: 0.2 % (ref 0–7)
ERYTHROCYTE [DISTWIDTH] IN BLOOD BY AUTOMATED COUNT: 14.3 % (ref 11.5–14.5)
GLOBULIN SER CALC-MCNC: 4.8 G/DL (ref 2–4)
GLUCOSE SERPL-MCNC: 107 MG/DL (ref 65–100)
HCT VFR BLD AUTO: 32.1 % (ref 35–47)
HGB BLD-MCNC: 10.6 G/DL (ref 11.5–16)
IMM GRANULOCYTES # BLD AUTO: 0.05 K/UL (ref 0–0.04)
IMM GRANULOCYTES NFR BLD AUTO: 0.6 % (ref 0–0.5)
LYMPHOCYTES # BLD: 0.74 K/UL (ref 0.8–3.5)
LYMPHOCYTES NFR BLD: 8.5 % (ref 12–49)
MCH RBC QN AUTO: 25.9 PG (ref 26–34)
MCHC RBC AUTO-ENTMCNC: 33 G/DL (ref 30–36.5)
MCV RBC AUTO: 78.5 FL (ref 80–99)
MONOCYTES # BLD: 0.57 K/UL (ref 0–1)
MONOCYTES NFR BLD: 6.6 % (ref 5–13)
NEUTS SEG # BLD: 7.31 K/UL (ref 1.8–8)
NEUTS SEG NFR BLD: 84 % (ref 32–75)
NRBC # BLD: 0 K/UL (ref 0–0.01)
NRBC BLD-RTO: 0 PER 100 WBC
PLATELET # BLD AUTO: 208 K/UL (ref 150–400)
PMV BLD AUTO: 11.7 FL (ref 8.9–12.9)
POTASSIUM SERPL-SCNC: 4 MMOL/L (ref 3.5–5.1)
PROT SERPL-MCNC: 7.4 G/DL (ref 6.4–8.2)
PROT UR-MCNC: 19 MG/DL (ref 0–11.9)
PROT/CREAT UR-RTO: 0.1
RBC # BLD AUTO: 4.09 M/UL (ref 3.8–5.2)
RBC MORPH BLD: ABNORMAL
SODIUM SERPL-SCNC: 135 MMOL/L (ref 136–145)
SPECIMEN EXP DATE BLD: NORMAL
WBC # BLD AUTO: 8.7 K/UL (ref 3.6–11)

## 2025-05-11 PROCEDURE — 86900 BLOOD TYPING SEROLOGIC ABO: CPT

## 2025-05-11 PROCEDURE — 84156 ASSAY OF PROTEIN URINE: CPT

## 2025-05-11 PROCEDURE — 86901 BLOOD TYPING SEROLOGIC RH(D): CPT

## 2025-05-11 PROCEDURE — 86850 RBC ANTIBODY SCREEN: CPT

## 2025-05-11 PROCEDURE — 85025 COMPLETE CBC W/AUTO DIFF WBC: CPT

## 2025-05-11 PROCEDURE — 4500000002 HC ER NO CHARGE

## 2025-05-11 PROCEDURE — 82570 ASSAY OF URINE CREATININE: CPT

## 2025-05-11 PROCEDURE — 80053 COMPREHEN METABOLIC PANEL: CPT

## 2025-05-12 ENCOUNTER — ROUTINE PRENATAL (OUTPATIENT)
Age: 30
End: 2025-05-12
Payer: COMMERCIAL

## 2025-05-12 VITALS — SYSTOLIC BLOOD PRESSURE: 114 MMHG | DIASTOLIC BLOOD PRESSURE: 65 MMHG | HEART RATE: 98 BPM

## 2025-05-12 DIAGNOSIS — Z34.90 PREGNANCY, UNSPECIFIED GESTATIONAL AGE: ICD-10-CM

## 2025-05-12 PROCEDURE — 99214 OFFICE O/P EST MOD 30 MIN: CPT | Performed by: STUDENT IN AN ORGANIZED HEALTH CARE EDUCATION/TRAINING PROGRAM

## 2025-05-12 PROCEDURE — 76820 UMBILICAL ARTERY ECHO: CPT | Performed by: STUDENT IN AN ORGANIZED HEALTH CARE EDUCATION/TRAINING PROGRAM

## 2025-05-12 PROCEDURE — 76818 FETAL BIOPHYS PROFILE W/NST: CPT | Performed by: STUDENT IN AN ORGANIZED HEALTH CARE EDUCATION/TRAINING PROGRAM

## 2025-05-12 NOTE — PROGRESS NOTES
Please see ultrasound report under Imaging tab or Media tab.  Rani Mas MD  [facility]

## 2025-05-12 NOTE — PROGRESS NOTES
2139: Pt arrived to OBED3 w/ complaints of abdominal cramping. Pt denies LOF, vaginal bleeding/discharge, visual changes, headache, RUQ pain and chest pain. Pt positive for fetal movement.     2200: Dr. Rose at the bedside to assess pt.

## 2025-05-12 NOTE — ED PROVIDER NOTES
History & Physical    Name: Iqra Dunn MRN: [id number]  SSN: [id number]    YOB: 1995  Age: 30 y.o.  Sex: female      Subjective:     Reason for Admission:  Pregnancy and Multiple Gestation/Twins and Abdominal Pain    History of Present Illness: Iqra Dunn is a 30 y.o.  female with an estimated gestational age of 29w1d with Estimated Date of Delivery: 25. Patient complains of moderate abdominal pain for 1 days. Pregnancy has been complicated by twins.  Patient denies contractions, nausea and vomiting, vaginal bleeding , and vaginal leaking of fluid .    OB History          2    Para   1    Term           1    AB        Living   1         SAB        IAB        Ectopic        Molar        Multiple        Live Births   1              Past Medical History:   Diagnosis Date    Hypertension 2023    Pre-eclampsia 3023     Past Surgical History:   Procedure Laterality Date     SECTION  2023     Social History     Occupational History    Not on file   Tobacco Use    Smoking status: Never    Smokeless tobacco: Never   Vaping Use    Vaping status: Never Used   Substance and Sexual Activity    Alcohol use: Not Currently    Drug use: Never    Sexual activity: Yes     Partners: Male     Family History   Problem Relation Age of Onset    Diabetes Mother     Cancer Father         lung       No Known Allergies  Prior to Admission medications    Medication Sig Start Date End Date Taking? Authorizing Provider   Ferrous Sulfate (SLOW FE PO) Take by mouth    Glenn La MD   ondansetron (ZOFRAN) 4 MG tablet Take 1 tablet by mouth every 8 hours as needed for Nausea or Vomiting 2/3/25   Marylu Ro MD   aspirin 81 MG EC tablet Take 1 tablet by mouth daily 25   Christine England MD   Prenatal Multivit-Min-Fe-FA (PRE- PO) Take by mouth    Glenn La MD   cetirizine (ZYRTEC) 10 MG tablet Take 1 tablet by mouth as needed

## 2025-05-12 NOTE — PROCEDURES
PATIENT: SAMIRA NUNEZ   -  : 1995   -  DOS:2025   -  INTERPRETING PROVIDER:Rani Mas,   Indication  ========    dichorionic/diamniotic pregnancy, hx pre-eclampsia, growth, additional anatomy views    Method  ======    Transabdominal ultrasound examination. External Fetal Monitor. View: Sufficient    Pregnancy  =========    twin pregnancy. Number of fetuses: 2. Dichorionic-diamniotic    Dating  ======    LMP on: 10/26/2024  GA by LMP 28 w + 2 d  FLORENTINO by LMP: 2025  Prior assessment on: 2024  Prior assessment by: From outside records  GA by prior assessment 29 w + 2 d  FLORENTINO by prior assessment: 2025  Assigned: based on stated FLORENTINO (on 2024, From outside records), selected on 2025  Assigned GA 29 w + 2 d  Assigned FLORENTINO: 2025    Fetus 1: General Evaluation  ==============    Cardiac activity present.  bpm. Fetal movements: visualized. Presentation: BREECH, maternal right  Placenta: Placental site: anterior, appropriate distance from the internal os  Umbilical cord: Cord vessels: 3 vessel cord. Insertion site: normal insertion    Fetus 1: Fetal Anatomy  ===========    Stomach: normal  Kidneys: normal  Bladder: normal  Cervical spine: NOT VISUALIZED  Thoracic spine: NOT VISUALIZED  Lumbar spine: NOT VISUALIZED  Sacral spine: NOT VISUALIZED  Rt arm: normal  Rt hand: NOT VISUALIZED  Rt fingers: NOT VISUALIZED  Lt hand: NOT VISUALIZED  Lt fingers: NOT VISUALIZED  Wants to know fetal sex: yes    Fetus 1: Amniotic Fluid Assessment  =====================    Amount of AF: normal  MVP 4.3 cm    Fetus 1: Biophysical Profile  ==============    2: Fetal breathing movements  2: Gross body movements  2: Fetal tone  2: Amniotic fluid volume  NST: reactive  10/10 Biophysical profile score    Fetus 1: Non Stress Test  =============    NST interpretation: reactive. Test duration 34 min. Baseline  bpm. Baseline variability: moderate. Accelerations: present. Decelerations:

## 2025-05-13 ENCOUNTER — ROUTINE PRENATAL (OUTPATIENT)
Age: 30
End: 2025-05-13
Payer: COMMERCIAL

## 2025-05-13 VITALS
SYSTOLIC BLOOD PRESSURE: 117 MMHG | TEMPERATURE: 98.4 F | OXYGEN SATURATION: 97 % | BODY MASS INDEX: 35.38 KG/M2 | DIASTOLIC BLOOD PRESSURE: 75 MMHG | HEART RATE: 91 BPM | RESPIRATION RATE: 17 BRPM | WEIGHT: 212.6 LBS

## 2025-05-13 DIAGNOSIS — Z34.90 PREGNANCY, UNSPECIFIED GESTATIONAL AGE: Primary | ICD-10-CM

## 2025-05-13 PROCEDURE — 0502F SUBSEQUENT PRENATAL CARE: CPT | Performed by: OBSTETRICS & GYNECOLOGY

## 2025-05-13 PROCEDURE — 90715 TDAP VACCINE 7 YRS/> IM: CPT | Performed by: OBSTETRICS & GYNECOLOGY

## 2025-05-13 PROCEDURE — 90471 IMMUNIZATION ADMIN: CPT | Performed by: OBSTETRICS & GYNECOLOGY

## 2025-05-13 NOTE — PROGRESS NOTES
Subjective: Doing well. Good FM x2. Denies VB, LOF.    /75 (BP Site: Right Upper Arm, Patient Position: Sitting, BP Cuff Size: Medium Adult)   Pulse 91   Temp 98.4 °F (36.9 °C) (Oral)   Resp 17   Wt 96.4 kg (212 lb 9.6 oz)   LMP 10/26/2024   SpO2 97%   BMI 35.38 kg/m²     Prenatal Fetal Information  Fetal HR: 158  Movement: Present    Recent MFM ultrasound  A:  bpm. Fetal movements: visualized. Presentation: BREECH, maternal right  Placenta: Placental site: anterior, appropriate distance from the internal os  Umbilical cord: Cord vessels: 3 vessel cord. Insertion site: normal insertion    B:  bpm. Fetal movements: visualized. Presentation: BREECH, maternal left  Placenta: Placental site: anterior, appropriate distance from the internal os  Umbilical cord: Cord vessels: 3 vessel cord. Insertion site: normal insertion    A/P  Iqra Dunn is a 30 y.o.  at 29w3d with pregnancy complicated by:   - Discussed plan for repeat c/s at 37 weeks (pending size of babies)- anticipating early July    Patient Active Problem List    Diagnosis Date Noted    26 weeks gestation of pregnancy 2025    Dichorionic diamniotic twin pregnancy in first trimester 2025    History of pre-eclampsia in prior pregnancy, currently pregnant 2025    History of  delivery 2025    Pregnancy 2025     Primary Provider: Jayjay YU (C/s x1 at 29 weeks due to preE with severe features)  Desires Repeat C/s  EDC by [facility]    Pregnancy problems:  Di/Di twin pregnancy  - Follow up growth scans: FGR x2 (A: Transverse, B: Cephalic) FGR  - 25: Baby A dopplers DEDF 3%. Baby B normal dopplers 15%  - Delivery by 37/0  - S/p BMZ -    History of c/s x1 at 29 weeks  - Desires repeat c/s  - Timing dependent on FGR    Obesity, pre preg BMI 33    History of pre-eclampsia with severe features, 29 week delivery  - Baby aspirin this pregnancy  - Baseline pre-E labs: Cr 0.77, ALT 20, AST

## 2025-05-13 NOTE — PROGRESS NOTES
Iqra Dunn is a 30 y.o.  at 29w3d      She endorses pain on right side when breathing in. She reports Positive FM, denies labor symptoms: vaginal bleeding, LOF, and contractions. She also denies pre-eclampsia symptoms: headaches, scotoma, RUQ pain, and edema.  She desires TDap vaccine. After obtaining consent, and per orders of Marylu Ro MD, injection of Tdap Vaccine given by Linh Grove RN. Patient instructed to remain in clinic for 20 minutes afterwards, and to report any adverse reaction to me immediately.    TDAP  : [employer]  Site: Deltoid, Right  Route: Intramuscular  Dose: 0.5ML  Lot #: [id number]   Exp date: 2027   NDC: 34007-579-36 .      1. Have you been to the ER, urgent care clinic, or hospitalized since your last visit? Yes- When: 25. Where: [facility].  Reason for visit: Abdominal pain  2. Have you seen or consulted any other health care providers [facility] since your last visit? No      She declines  a chaperone during the gynecologic exam today.

## 2025-05-15 ENCOUNTER — ROUTINE PRENATAL (OUTPATIENT)
Age: 30
End: 2025-05-15
Payer: COMMERCIAL

## 2025-05-15 VITALS — SYSTOLIC BLOOD PRESSURE: 117 MMHG | HEART RATE: 96 BPM | DIASTOLIC BLOOD PRESSURE: 71 MMHG

## 2025-05-15 DIAGNOSIS — Z34.90 PREGNANCY, UNSPECIFIED GESTATIONAL AGE: ICD-10-CM

## 2025-05-15 PROCEDURE — 76819 FETAL BIOPHYS PROFIL W/O NST: CPT | Performed by: STUDENT IN AN ORGANIZED HEALTH CARE EDUCATION/TRAINING PROGRAM

## 2025-05-15 PROCEDURE — 76820 UMBILICAL ARTERY ECHO: CPT | Performed by: STUDENT IN AN ORGANIZED HEALTH CARE EDUCATION/TRAINING PROGRAM

## 2025-05-15 PROCEDURE — 99214 OFFICE O/P EST MOD 30 MIN: CPT | Performed by: STUDENT IN AN ORGANIZED HEALTH CARE EDUCATION/TRAINING PROGRAM

## 2025-05-15 NOTE — PROGRESS NOTES
Patient was seen 5/15/2025      Please look under media to view full consult and ultrasound report in ViewPoint.         Christine England MD   Maternal Fetal Medicine

## 2025-05-15 NOTE — PROCEDURES
PATIENT: SAMIRA NUNEZ   -  : 1995   -  DOS:05/15/2025   -  INTERPRETING PROVIDER:Christine England,   Indication  ========    dichorionic/diamniotic pregnancy, hx pre-eclampsia, growth, additional anatomy views    Method  ======    Transabdominal ultrasound examination. View: Good view    Pregnancy  =========    twin pregnancy. Number of fetuses: 2. Dichorionic-diamniotic    Dating  ======    LMP on: 10/26/2024  GA by LMP 28 w + 5 d  FLORENTINO by LMP: 2025  Prior assessment on: 2024  Prior assessment by: From outside records  GA by prior assessment 29 w + 5 d  FLORENTINO by prior assessment: 2025  Assigned: based on stated FLORENTINO (on 2024, From outside records), selected on 2025  Assigned GA 29 w + 5 d  Assigned FLORENTINO: 2025    Fetus 1: General Evaluation  ==============    Cardiac activity present.  bpm. Fetal movements: visualized. Presentation: Transverse, head to maternal right. Inferior/right  Placenta: Placental site: anterior, appropriate distance from the internal os  Umbilical cord: Cord vessels: 3 vessel cord. Insertion site: normal insertion    Fetus 1: Fetal Anatomy  ===========    Stomach: normal  Kidneys: normal  Bladder: normal  Cervical spine: NOT VISUALIZED  Thoracic spine: NOT VISUALIZED  Lumbar spine: NOT VISUALIZED  Sacral spine: NOT VISUALIZED  Rt arm: normal  Rt hand: NOT VISUALIZED  Rt fingers: NOT VISUALIZED  Lt hand: NOT VISUALIZED  Lt fingers: NOT VISUALIZED  Wants to know fetal sex: yes    Fetus 1: Amniotic Fluid Assessment  =====================    Amount of AF: normal  MVP 5.5 cm    Fetus 1: Biophysical Profile  ==============    2: Fetal breathing movements  2: Gross body movements  2: Fetal tone  2: Amniotic fluid volume   Biophysical profile score    Fetus 1: Fetal Doppler  ===========    Arterial  Umbilical artery: increased  Umbilical A PI 1.25 95% Ebbing  Umbilical A RI 0.76 95% Ruddy  Umbilical A PS 49.55 cm/s 71% Ebbing  Umbilical A ED 13.30

## 2025-05-19 ENCOUNTER — ROUTINE PRENATAL (OUTPATIENT)
Age: 30
End: 2025-05-19
Payer: COMMERCIAL

## 2025-05-19 VITALS — DIASTOLIC BLOOD PRESSURE: 70 MMHG | HEART RATE: 94 BPM | SYSTOLIC BLOOD PRESSURE: 111 MMHG

## 2025-05-19 DIAGNOSIS — Z34.90 PREGNANCY, UNSPECIFIED GESTATIONAL AGE: ICD-10-CM

## 2025-05-19 DIAGNOSIS — O36.5990 FETAL GROWTH RESTRICTION ANTEPARTUM: Primary | ICD-10-CM

## 2025-05-19 DIAGNOSIS — O09.299 HISTORY OF PRE-ECLAMPSIA IN PRIOR PREGNANCY, CURRENTLY PREGNANT: ICD-10-CM

## 2025-05-19 DIAGNOSIS — Z98.891 HISTORY OF CESAREAN DELIVERY: ICD-10-CM

## 2025-05-19 DIAGNOSIS — O30.043 DICHORIONIC DIAMNIOTIC TWIN PREGNANCY IN THIRD TRIMESTER: ICD-10-CM

## 2025-05-19 PROCEDURE — 76819 FETAL BIOPHYS PROFIL W/O NST: CPT | Performed by: STUDENT IN AN ORGANIZED HEALTH CARE EDUCATION/TRAINING PROGRAM

## 2025-05-19 PROCEDURE — 76820 UMBILICAL ARTERY ECHO: CPT | Performed by: STUDENT IN AN ORGANIZED HEALTH CARE EDUCATION/TRAINING PROGRAM

## 2025-05-19 PROCEDURE — 99213 OFFICE O/P EST LOW 20 MIN: CPT

## 2025-05-19 NOTE — PROCEDURES
PATIENT: SAMIRA NUNEZ   -  : 1995   -  DOS:2025   -  INTERPRETING PROVIDER:Christine England,   Indication  ========    dichorionic/diamniotic pregnancy, hx pre-eclampsia, FGR (baby A)    Method  ======    Transabdominal ultrasound examination. View: Sufficient    Pregnancy  =========    twin pregnancy. Number of fetuses: 2. Dichorionic-diamniotic    Dating  ======    LMP on: 10/26/2024  GA by LMP 29 w + 2 d  FLORENTINO by LMP: 2025  Prior assessment on: 2024  Prior assessment by: From outside records  GA by prior assessment 30 w + 2 d  FLORENTINO by prior assessment: 2025  Assigned: based on stated FLORENTINO (on 2024, From outside records), selected on 2025  Assigned GA 30 w + 2 d  Assigned FLORENTINO: 2025    Fetus 1: General Evaluation  ==============    Cardiac activity present.  bpm. Fetal movements: visualized. Presentation: Transverse, head to maternal left, located on maternal right  Placenta: Placental site: anterior, appropriate distance from the internal os  Umbilical cord: Cord vessels: 3 vessel cord. Insertion site: normal insertion    Fetus 1: Fetal Anatomy  ===========    Stomach: normal  Kidneys: normal  Bladder: normal  Cervical spine: NOT VISUALIZED  Thoracic spine: NOT VISUALIZED  Lumbar spine: NOT VISUALIZED  Sacral spine: NOT VISUALIZED  Rt arm: normal  Rt hand: NOT VISUALIZED  Rt fingers: NOT VISUALIZED  Lt hand: NOT VISUALIZED  Lt fingers: NOT VISUALIZED  Wants to know fetal sex: yes    Fetus 1: Amniotic Fluid Assessment  =====================    Amount of AF: normal  MVP 2.7 cm    Fetus 1: Biophysical Profile  ==============    2: Fetal breathing movements  2: Gross body movements  2: Fetal tone  2: Amniotic fluid volume  8/8 Biophysical profile score    Fetus 1: Fetal Doppler  ===========    Arterial  Umbilical artery: normal  Umbilical A sampling site: midcord  Umbilical A PI 1.27 97% Ebbing  Umbilical A RI 0.74 92% Ruddy  Umbilical A PS 51.60 cm/s  Umbilical

## 2025-05-19 NOTE — PROGRESS NOTES
Assessment & Plan   ASSESSMENT/PLAN:  1. Fetal growth restriction antepartum  2. Dichorionic diamniotic twin pregnancy in third trimester  3. History of pre-eclampsia in prior pregnancy, currently pregnant  4. History of  delivery    JOSE is 30 yrs of age,  at 30w 2d.      FGR Baby A   - Baby A dopplers normal today (have been fluctuating through the course of pregnancy)   - Baby B normal today (stable)   - The patient likely had FGR in her prior pregnancy along with preeclampsia.  - No evidence of hypertension in this pregnancy, BP normotensive   - FGR etiology likely multiple issues. No absent or reversed flow on dopplers.   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      Di/Di TIUP   - prev counseled   - NIPT normal  - Normal anatomic survey today (not complete)   - Delivery recommended between 37.0-38.6 unless other clinical indications arise   - Fetal movements, PIH and PTL precautions reviewed.      Hx of preE in GI:   - Previously reviewed her prior pregnancy which was complicated by preeclampsia. She reports onset at 28 weeks gestation with delivery at 29 weeks. She does not currently have CHTN. We discussed that given her prior history she will be at increased recurrence risk for preeclampsia and recurrence is generally earlier than w/her prior pregnancy.   - baseline labs: PC ratio 0.1, CMP/CBC wnl (25)   - growth scan at 32 weeks      Maternal Obesity (Pre-Pregnancy BMI 32 ):  - ANT/Delivery recommendations per other indications.      CSx1   - at 29 weeks for preE     Recommendations  Continue weekly BPP/dopplers  Growth q 3 weeks (next scheduled in 1 week)  Del: pending clinical outcome     Patient images have been reviewed. Agree with the plan of care as outlined above. Christine England MD Maternal Fetal

## 2025-05-19 NOTE — PROGRESS NOTES
Patient was seen 5/19/2025      Please look under media to view full consult and ultrasound report in ViewPoint.         Christine England MD   Maternal Fetal Medicine

## 2025-05-22 ENCOUNTER — ROUTINE PRENATAL (OUTPATIENT)
Age: 30
End: 2025-05-22
Payer: COMMERCIAL

## 2025-05-22 VITALS — SYSTOLIC BLOOD PRESSURE: 108 MMHG | DIASTOLIC BLOOD PRESSURE: 69 MMHG | HEART RATE: 78 BPM

## 2025-05-22 DIAGNOSIS — Z34.90 PREGNANCY, UNSPECIFIED GESTATIONAL AGE: ICD-10-CM

## 2025-05-22 PROCEDURE — 99214 OFFICE O/P EST MOD 30 MIN: CPT | Performed by: STUDENT IN AN ORGANIZED HEALTH CARE EDUCATION/TRAINING PROGRAM

## 2025-05-22 PROCEDURE — 76819 FETAL BIOPHYS PROFIL W/O NST: CPT | Performed by: STUDENT IN AN ORGANIZED HEALTH CARE EDUCATION/TRAINING PROGRAM

## 2025-05-22 PROCEDURE — 76820 UMBILICAL ARTERY ECHO: CPT | Performed by: STUDENT IN AN ORGANIZED HEALTH CARE EDUCATION/TRAINING PROGRAM

## 2025-05-22 PROCEDURE — 76818 FETAL BIOPHYS PROFILE W/NST: CPT | Performed by: STUDENT IN AN ORGANIZED HEALTH CARE EDUCATION/TRAINING PROGRAM

## 2025-05-22 NOTE — PROCEDURES
PATIENT: SAMIRA NUNEZ   -  : 1995   -  DOS:2025   -  INTERPRETING PROVIDER:Christine England,   Indication  ========    dichorionic/diamniotic pregnancy, hx pre-eclampsia, FGR (baby A)    Method  ======    Transabdominal ultrasound examination. External Fetal Monitor. View: suboptimal due to unfavorable fetal position    Pregnancy  =========    twin pregnancy. Number of fetuses: 2. Dichorionic-diamniotic    Dating  ======    LMP on: 10/26/2024  GA by LMP 29 w + 5 d  FLORENTINO by LMP: 2025  Prior assessment on: 2024  Prior assessment by: From outside records  GA by prior assessment 30 w + 5 d  FLORENTINO by prior assessment: 2025  Assigned: based on stated FLORENTINO (on 2024, From outside records), selected on 2025  Assigned GA 30 w + 5 d  Assigned FLORENTINO: 2025    Fetus 1: General Evaluation  ==============    Cardiac activity present.  bpm. Fetal movements: visualized. Presentation: BREECH, maternal right  Placenta: Placental site: anterior, appropriate distance from the internal os  Umbilical cord: Cord vessels: 3 vessel cord. Insertion site: normal insertion    Fetus 1: Fetal Anatomy  ===========    Stomach: normal  Kidneys: normal  Bladder: normal  Cervical spine: NOT VISUALIZED  Thoracic spine: NOT VISUALIZED  Lumbar spine: NOT VISUALIZED  Sacral spine: NOT VISUALIZED  Rt arm: normal  Rt hand: NOT VISUALIZED  Rt fingers: NOT VISUALIZED  Lt hand: NOT VISUALIZED  Lt fingers: NOT VISUALIZED  Wants to know fetal sex: yes    Fetus 1: Amniotic Fluid Assessment  =====================    Amount of AF: normal  MVP 7.6 cm    Fetus 1: Biophysical Profile  ==============    2: Fetal breathing movements  2: Gross body movements  2: Fetal tone  2: Amniotic fluid volume  8/8 Biophysical profile score    Fetus 1: Non Stress Test  =============    NST interpretation: inadequate. Test duration 23 min. Uterine activity: absent  RN in room attempting to get FHR tracing throughout. Fetus very

## 2025-05-22 NOTE — PROGRESS NOTES
Patient was seen 5/22/2025      Please look under media to view full consult and ultrasound report in ViewPoint.         Christine England MD   Maternal Fetal Medicine

## 2025-05-28 ENCOUNTER — ROUTINE PRENATAL (OUTPATIENT)
Age: 30
End: 2025-05-28
Payer: COMMERCIAL

## 2025-05-28 ENCOUNTER — ROUTINE PRENATAL (OUTPATIENT)
Age: 30
End: 2025-05-28

## 2025-05-28 VITALS — SYSTOLIC BLOOD PRESSURE: 118 MMHG | HEART RATE: 82 BPM | DIASTOLIC BLOOD PRESSURE: 72 MMHG

## 2025-05-28 VITALS
OXYGEN SATURATION: 97 % | BODY MASS INDEX: 36.21 KG/M2 | TEMPERATURE: 98.5 F | SYSTOLIC BLOOD PRESSURE: 118 MMHG | WEIGHT: 217.6 LBS | DIASTOLIC BLOOD PRESSURE: 77 MMHG | RESPIRATION RATE: 16 BRPM | HEART RATE: 76 BPM

## 2025-05-28 DIAGNOSIS — Z34.90 PREGNANCY, UNSPECIFIED GESTATIONAL AGE: ICD-10-CM

## 2025-05-28 DIAGNOSIS — N89.8 VAGINAL ITCHING: Primary | ICD-10-CM

## 2025-05-28 PROCEDURE — 59025 FETAL NON-STRESS TEST: CPT | Performed by: STUDENT IN AN ORGANIZED HEALTH CARE EDUCATION/TRAINING PROGRAM

## 2025-05-28 PROCEDURE — 76816 OB US FOLLOW-UP PER FETUS: CPT | Performed by: STUDENT IN AN ORGANIZED HEALTH CARE EDUCATION/TRAINING PROGRAM

## 2025-05-28 PROCEDURE — 0502F SUBSEQUENT PRENATAL CARE: CPT | Performed by: OBSTETRICS & GYNECOLOGY

## 2025-05-28 PROCEDURE — 76820 UMBILICAL ARTERY ECHO: CPT | Performed by: STUDENT IN AN ORGANIZED HEALTH CARE EDUCATION/TRAINING PROGRAM

## 2025-05-28 PROCEDURE — 99214 OFFICE O/P EST MOD 30 MIN: CPT | Performed by: STUDENT IN AN ORGANIZED HEALTH CARE EDUCATION/TRAINING PROGRAM

## 2025-05-28 NOTE — PROGRESS NOTES
Patient was seen 5/28/2025      Please look under media to view full consult and ultrasound report in ViewPoint.         Christine England MD   Maternal Fetal Medicine

## 2025-05-28 NOTE — PROGRESS NOTES
Iqra Dunn is a 30 y.o.  at 31w4d      She reports Positive FM, denies labor symptoms: vaginal bleeding, LOF, and contractions. She also denies pre-eclampsia symptoms: headaches, scotoma, RUQ pain, and edema.       1. Have you been to the ER, urgent care clinic, or hospitalized since your last visit? No  2. Have you seen or consulted any other health care providers outside of the [facility] since your last visit? No      She declines  a chaperone during the gynecologic exam today.

## 2025-05-28 NOTE — PROGRESS NOTES
AWV completed. Subjective: Doing well. Good FM. Denies VB, LOF. States that [facility] recommends delivery at 36 weeks given FGR. Ongoing itching.    /77 (BP Site: Right Upper Arm, Patient Position: Sitting, BP Cuff Size: Large Adult)   Pulse 76   Temp 98.5 °F (36.9 °C) (Oral)   Resp 16   Wt 98.7 kg (217 lb 9.6 oz)   LMP 10/26/2024   SpO2 97%   BMI 36.21 kg/m²     Prenatal Fetal Information  Fetal HR: MFM  Movement: Present    SSE: White discharge, Nuswab obtained    A/P  Iqra Dunn is a 30 y.o.  at 31w4d with pregnancy complicated by:   - 36 week c/s would be week of - Will request 12 PM on   - Discussed over the counter monistat- will follow up [facility]    Patient Active Problem List    Diagnosis Date Noted    26 weeks gestation of pregnancy 2025    Dichorionic diamniotic twin pregnancy in first trimester 2025    History of pre-eclampsia in prior pregnancy, currently pregnant 2025    History of  delivery 2025    Pregnancy 2025     Primary Provider: Jayjay   (C/s x1 at 29 weeks due to preE with severe features)  Desires Repeat C/s  EDC by FTUS    Pregnancy problems:  Di/Di twin pregnancy  - Follow up growth scans: FGR x2 (A: Transverse, B: Cephalic) FGR  - 25: Baby A dopplers DEDF 3%. Baby B normal dopplers 15%  - 25: Baby A Transverse, UA dopplers normal. Baby B, cephalic, UA dopplers normal  - Delivery between 37/0-38/6  - S/p BMZ -    History of c/s x1 at 29 weeks  - Desires repeat c/s  - Timing dependent on FGR    Obesity, pre preg BMI 33    History of pre-eclampsia with severe features, 29 week delivery  - Baby aspirin this pregnancy  - Baseline pre-E labs: Cr 0.77, ALT 20, AST 10, 11.6/35.5, UPC 0.1    Routine OB:  - PNLs: O+/absc neg, Hgb A2', R imm, VZV NI, RPR NR, Hep B Neg, Hep C NR, HIV NR, G/C/T neg, pap , urine cx NG  - Genetic Screening: NIPT low risk: girl/boy  - Anatomy scan: Fetus A: BREECH, maternal right presentation. EFW is

## 2025-05-28 NOTE — PROCEDURES
to exclude all anomalies, have been reviewed with the patient. All  questions and concerns addressed.    Consultation  ==========    JOSE is 30 yrs of age,  at 31w 4d.    FGR Baby A AND baby B noted today (new FGR of B noted today) UA dopplers normal for both  - Baby A dopplers normal today (have been fluctuating through the course of pregnancy)  - Baby B new FGR with normal dopplers today (stable)  - The patient likely had FGR in her prior pregnancy along with preeclampsia.  - No evidence of hypertension in this pregnancy, BP normotensive  - Growth q3 weeks from diagnosis  - BPP + Doppler weekly  - Delivery: at 36.0-36.6 given FGR in di/di TIUP.    Di/Di TIUP  - prev counseled  - NIPT normal  - Normal anatomic survey today (not complete)  - Fetal movements, PIH and PTL precautions reviewed.    Hx of preE in GI:  - Previously reviewed her prior pregnancy which was complicated by preeclampsia. She reports onset at 28 weeks gestation with delivery at 29 weeks. She does not  currently have CHTN. We discussed that given her prior history she will be at increased recurrence risk for preeclampsia and recurrence is generally earlier than w/her prior  pregnancy.  - baseline labs: PC ratio 0.1, CMP/CBC wnl (25)    Maternal Obesity (Pre-Pregnancy BMI 32 ):  - ANT/Delivery recommendations per other indications.    CSx1  - at 29 weeks for preE    Recommendations  ==============    - Growth q3 weeks from diagnosis  - BPP + Doppler weekly  - Delivery: at 36.0-36.6 given FGR in di/di TIUP.    Coding  ======    Code: O99.013  Description: Anemia complicating pregnancy  Code: 79280  Description: Ultrasound, pregnant uterus, real time with image documentation, follow up, transabdominal approach per fetus  Code: 87061  Description: Ultrasound, pregnant uterus, real time with image documentation, follow up, transabdominal approach per fetus  Code: 08817  Description: Doppler velocimetry, fetal; umbilical

## 2025-05-29 ENCOUNTER — TELEPHONE (OUTPATIENT)
Age: 30
End: 2025-05-29

## 2025-05-29 NOTE — TELEPHONE ENCOUNTER
Name and  verified. Patient called regarding need for 35 week appointment. Patient schedule for visit prior to repeat LTCS on . Patient also scheduled for PP visit as well with RN.

## 2025-05-31 LAB
A VAGINAE DNA VAG QL NAA+PROBE: NORMAL SCORE
BVAB2 DNA VAG QL NAA+PROBE: NORMAL SCORE
C ALBICANS DNA VAG QL NAA+PROBE: NEGATIVE
C GLABRATA DNA VAG QL NAA+PROBE: NEGATIVE
MEGA1 DNA VAG QL NAA+PROBE: NORMAL SCORE
SPECIMEN SOURCE: NORMAL

## 2025-06-02 ENCOUNTER — RESULTS FOLLOW-UP (OUTPATIENT)
Age: 30
End: 2025-06-02

## 2025-06-04 ENCOUNTER — ROUTINE PRENATAL (OUTPATIENT)
Age: 30
End: 2025-06-04
Payer: COMMERCIAL

## 2025-06-04 VITALS — SYSTOLIC BLOOD PRESSURE: 133 MMHG | DIASTOLIC BLOOD PRESSURE: 64 MMHG | HEART RATE: 100 BPM

## 2025-06-04 DIAGNOSIS — Z34.90 PREGNANCY, UNSPECIFIED GESTATIONAL AGE: ICD-10-CM

## 2025-06-04 PROCEDURE — 99213 OFFICE O/P EST LOW 20 MIN: CPT | Performed by: OBSTETRICS & GYNECOLOGY

## 2025-06-04 NOTE — PROCEDURES
PATIENT: SAMIRA NUNEZ   -  : 1995   -  DOS:2025   -  INTERPRETING PROVIDER:Juan Rhoades,   Indication  ========    dichorionic/diamniotic pregnancy, hx pre-eclampsia, FGR (baby A)    Method  ======    Transabdominal ultrasound examination. External Fetal Monitor. View: suboptimal shadowing from fetal limbs    Pregnancy  =========    twin pregnancy. Number of fetuses: 2. Dichorionic-diamniotic    Dating  ======    LMP on: 10/26/2024  GA by LMP 31 w + 4 d  FLORENTINO by LMP: 2025  Prior assessment on: 2024  Prior assessment by: From outside records  GA by prior assessment 32 w + 4 d  FLORENTINO by prior assessment: 2025  Assigned: based on stated FLORENTINO (on 2024, From outside records), selected on 2025  Assigned GA 32 w + 4 d  Assigned FLORENTINO: 2025    Fetus 1: General Evaluation  ==============    Cardiac activity present.  bpm. Fetal movements: visualized. Presentation: cephalic, maternal right  Placenta: Placental site: anterior, appropriate distance from the internal os  Umbilical cord: Cord vessels: 3 vessel cord. Insertion site: normal insertion    Fetus 1: Fetal Anatomy  ===========    Stomach: normal  Kidneys: normal  Bladder: normal  Cervical spine: NOT VISUALIZED  Thoracic spine: NOT VISUALIZED  Lumbar spine: NOT VISUALIZED  Sacral spine: NOT VISUALIZED  Rt arm: normal  Rt hand: NOT VISUALIZED  Rt fingers: NOT VISUALIZED  Lt hand: NOT VISUALIZED  Lt fingers: NOT VISUALIZED  Wants to know fetal sex: yes    Fetus 1: Amniotic Fluid Assessment  =====================    Amount of AF: normal  MVP 5.6 cm    Fetus 1: Biophysical Profile  ==============    2: Fetal breathing movements  2: Gross body movements  2: Fetal tone  2: Amniotic fluid volume  NST: reactive  10/10 Biophysical profile score    Fetus 1: Non Stress Test  =============    NST interpretation: reactive. Test duration 25 min. Baseline  bpm. Baseline variability: moderate. Accelerations: present.

## 2025-06-09 ENCOUNTER — ROUTINE PRENATAL (OUTPATIENT)
Age: 30
End: 2025-06-09

## 2025-06-09 VITALS
WEIGHT: 226.4 LBS | DIASTOLIC BLOOD PRESSURE: 82 MMHG | BODY MASS INDEX: 37.67 KG/M2 | RESPIRATION RATE: 17 BRPM | SYSTOLIC BLOOD PRESSURE: 125 MMHG | HEART RATE: 80 BPM | OXYGEN SATURATION: 98 % | TEMPERATURE: 98.2 F

## 2025-06-09 DIAGNOSIS — Z34.90 PREGNANCY, UNSPECIFIED GESTATIONAL AGE: ICD-10-CM

## 2025-06-09 DIAGNOSIS — Z34.90 PREGNANCY, UNSPECIFIED GESTATIONAL AGE: Primary | ICD-10-CM

## 2025-06-09 PROCEDURE — 0502F SUBSEQUENT PRENATAL CARE: CPT | Performed by: OBSTETRICS & GYNECOLOGY

## 2025-06-09 NOTE — PROGRESS NOTES
Iqra Dunn is a 30 y.o.  at 33w2d      She reports Positive FM, denies labor symptoms: vaginal bleeding, LOF, and contractions. She also denies pre-eclampsia symptoms: headaches, scotoma, RUQ pain, and edema.       1. Have you been to the ER, urgent care clinic, or hospitalized since your last visit? No  2. Have you seen or consulted any other health care providers outside of the [facility] since your last visit? No      She declines  a chaperone during the gynecologic exam today.  
neg, pap , urine cx NG  - Genetic Screening: NIPT low risk: girl/boy  - Anatomy scan: Fetus A: BREECH, maternal right presentation. EFW is 384 g at 15% and AC at 29%. Anterior placenta  Fetus B: Transverse, head to maternal right. EFW is 392 g at 19% and AC at 25%. Anterior placenta  EFW discordance at 2.1   - Vaccines: [ ] Flu  [x] TDAP: 5/13/2025  - 1 hr GTT: 97  - Third Tri Labs: 10.6/34.1/247  - [x] GBS- collected 6/9/2025  - [ ] Fetal presentation    Pain mgmt. in labor:  Feeding:  [employer]  Social: Iqra works at the  at the [employer] [place]. Partner Cathy works in maintenance. Daughter Marisela.        Marylu Ro MD

## 2025-06-11 ENCOUNTER — HOSPITAL ENCOUNTER (EMERGENCY)
Facility: HOSPITAL | Age: 30
Discharge: HOME | End: 2025-06-11
Attending: STUDENT IN AN ORGANIZED HEALTH CARE EDUCATION/TRAINING PROGRAM
Payer: COMMERCIAL

## 2025-06-11 ENCOUNTER — TELEMEDICINE (OUTPATIENT)
Age: 30
End: 2025-06-11

## 2025-06-11 ENCOUNTER — APPOINTMENT (OUTPATIENT)
Facility: HOSPITAL | Age: 30
End: 2025-06-11
Payer: COMMERCIAL

## 2025-06-11 VITALS
RESPIRATION RATE: 19 BRPM | WEIGHT: 226.19 LBS | HEIGHT: 65 IN | HEART RATE: 83 BPM | TEMPERATURE: 99 F | DIASTOLIC BLOOD PRESSURE: 87 MMHG | SYSTOLIC BLOOD PRESSURE: 139 MMHG | OXYGEN SATURATION: 99 % | BODY MASS INDEX: 37.69 KG/M2

## 2025-06-11 VITALS — SYSTOLIC BLOOD PRESSURE: 118 MMHG | HEART RATE: 92 BPM | DIASTOLIC BLOOD PRESSURE: 73 MMHG

## 2025-06-11 DIAGNOSIS — M79.89 LEG SWELLING: ICD-10-CM

## 2025-06-11 DIAGNOSIS — Z34.90 PREGNANCY, UNSPECIFIED GESTATIONAL AGE: ICD-10-CM

## 2025-06-11 DIAGNOSIS — Z3A.33 33 WEEKS GESTATION OF PREGNANCY: Primary | ICD-10-CM

## 2025-06-11 LAB — ECHO BSA: 2.17 M2

## 2025-06-11 PROCEDURE — 93970 EXTREMITY STUDY: CPT

## 2025-06-11 PROCEDURE — 99282 EMERGENCY DEPT VISIT SF MDM: CPT

## 2025-06-11 ASSESSMENT — ENCOUNTER SYMPTOMS
ABDOMINAL PAIN: 0
NAUSEA: 0
DIARRHEA: 0
VOMITING: 0
EYE PAIN: 0
SORE THROAT: 0
SHORTNESS OF BREATH: 0
COUGH: 0

## 2025-06-11 ASSESSMENT — PAIN - FUNCTIONAL ASSESSMENT: PAIN_FUNCTIONAL_ASSESSMENT: 0-10

## 2025-06-11 ASSESSMENT — PAIN SCALES - GENERAL: PAINLEVEL_OUTOF10: 0

## 2025-06-11 NOTE — PROCEDURES
PATIENT: SAMIRA NUNEZ   -  : 1995   -  DOS:2025   -  INTERPRETING PROVIDER:Christine England,   Indication  ========    dichorionic/diamniotic pregnancy, hx pre-eclampsia, FGR (both A and B)    Method  ======    Transabdominal ultrasound examination. View: Sufficient    Pregnancy  =========    twin pregnancy. Number of fetuses: 2. Dichorionic-diamniotic    Dating  ======    LMP on: 10/26/2024  GA by LMP 32 w + 4 d  FLORENTINO by LMP: 2025  Prior assessment on: 2024  Prior assessment by: From outside records  GA by prior assessment 33 w + 4 d  FLORENTINO by prior assessment: 2025  Assigned: based on stated FLORENTINO (on 2024, From outside records), selected on 2025  Assigned GA 33 w + 4 d  Assigned FLORENTINO: 2025    Fetus 1: General Evaluation  ==============    Cardiac activity present.  bpm. Fetal movements: visualized. Presentation: cephalic, maternal right  Placenta: Placental site: anterior, appropriate distance from the internal os  Umbilical cord: Cord vessels: 3 vessel cord. Insertion site: normal insertion    Fetus 1: Fetal Anatomy  ===========    Stomach: normal  Kidneys: normal  Bladder: normal  Cervical spine: NOT VISUALIZED  Thoracic spine: NOT VISUALIZED  Lumbar spine: NOT VISUALIZED  Sacral spine: NOT VISUALIZED  Rt arm: normal  Rt hand: normal  Rt fingers: SUBOPTIMAL  Lt hand: NOT VISUALIZED  Lt fingers: NOT VISUALIZED  Wants to know fetal sex: yes    Fetus 1: Amniotic Fluid Assessment  =====================    Amount of AF: normal  MVP 4.4 cm    Fetus 1: Non Stress Test  =============    NST interpretation: reactive. Test duration 20 min. Baseline  bpm. Baseline variability: moderate. Accelerations: present. Decelerations: absent. Uterine activity:  absent    Fetus 1: Fetal Doppler  ===========    Arterial  Umbilical artery: normal  Umbilical A sampling site: midcord  Umbilical A PI 1.13 93% Ebbing  Umbilical A RI 0.69 87% Ruddy  Umbilical A PS 55.42 cm/s 77%

## 2025-06-11 NOTE — PROGRESS NOTES
Provided verbal communication regarding Non-Stress Test (NST), fetal movement counts, labor precautions, and signs and symptoms of pre-eclampsia. She does report very swollen, uncomfortable lower legs. She has been consistent with compression socks and elevating whenever possible. Provider made aware. Patient verbalized understanding of the information provided. All patient questions were answered.  Patient was recommended to be seen in ED. Patient offered an escort, ride, or directions, she was happy to take directions.

## 2025-06-11 NOTE — ED NOTES
ED SIGN OUT NOTE  Care assumed at [facility] 5:48 PM EDT    Patient was signed out to me by Charissa Guerin PA-C.     Patient is awaiting bilateral venous duplex to rule out DVT.  Patient is a 30-year-old female at 33 weeks gestation sent by her OB/GYN for rule out blood clots.  She has had 1 week of bilateral lower extremity swelling.  Denies chest pain, shortness of breath, headache, dizziness, visual disturbances, numbness/tingling.  Denies any history of prior DVT/PE or history of CHF.  Denies abdominal pain, vaginal bleeding/discharge, leakage of fluid.  She is still feeling babies move well.    /87   Pulse 83   Temp 99 °F (37.2 °C) (Oral)   Resp 19   Ht 1.651 m (5' 5\")   Wt 102.6 kg (226 lb 3.1 oz)   LMP 10/26/2024   SpO2 99%   BMI 37.64 kg/m²     Labs Reviewed - No data to display  Vascular duplex lower extremity venous bilateral                  Diagnosis:   1. 33 weeks gestation of pregnancy    2. Leg swelling        Disposition:   Decision To Discharge 06/11/2025 07:00:47 PM    Plan:   Patient without any chest pain or shortness of breath.  No tachycardia or tachypnea and is satting well on room air.  I have a low suspicion for CHF exacerbation or PE.  Patient without symptoms concerning for preeclampsia.  Patient is followed by high risk OB/GYN who evaluated her and sent here for DVT studies.  Duplex ultrasound of bilateral lower extremities was performed without evidence of acute thrombosis.  Patient is stable for discharge home at this time.  Recommend that she keep her legs propped up when at home.  Return precautions discussed with patient who expresses understanding and is in agreement with the current plan.  Recommend follow-up with her OB/GYN.    CHRISTIANO Kent Andrea K, PA-C  06/11/25 1910

## 2025-06-11 NOTE — PROGRESS NOTES
Patient was seen 6/11/2025      Please look under media to view full consult and ultrasound report in ViewPoint.         Christine England MD   Maternal Fetal Medicine

## 2025-06-11 NOTE — ED PROVIDER NOTES
[facility] EMERGENCY DEPARTMENT  EMERGENCY DEPARTMENT ENCOUNTER      Pt Name: Iqra Dunn  MRN: [id number]  Birthdate 1995  Date of evaluation: 2025  Provider: QUETA DAMIAN    CHIEF COMPLAINT       Chief Complaint   Patient presents with    Leg Swelling         HISTORY OF PRESENT ILLNESS   (Location/Symptom, Timing/Onset, Context/Setting, Quality, Duration, Modifying Factors, Severity)  Note limiting factors.   30-year-old  female at 33 weeks gestation presents ED with swelling in her legs. Patient reports that she has been having swelling in both legs for the past week. She notes that her BP has been okay. She reports that she was at her OB before this and they referred her here to rule out DVT. She denies any numbness, tingling, CP, SOB, lightheadedness. Denies any blood clot history. She had pre-eclampsia with her last pregnancy. No abdominal or vaginal discharge, HA.             Review of External Medical Records:     Nursing Notes were reviewed.    REVIEW OF SYSTEMS    (2-9 systems for level 4, 10 or more for level 5)     Review of Systems   Constitutional:  Negative for chills and fever.   HENT:  Negative for congestion, ear pain and sore throat.    Eyes:  Negative for pain.   Respiratory:  Negative for cough and shortness of breath.    Cardiovascular:  Positive for leg swelling. Negative for chest pain.   Gastrointestinal:  Negative for abdominal pain, diarrhea, nausea and vomiting.   Genitourinary:  Negative for dysuria and flank pain.   Musculoskeletal:  Negative for myalgias.   Skin:  Negative for rash.   Neurological:  Negative for dizziness and headaches.   Hematological:  Negative for adenopathy.       Except as noted above the remainder of the review of systems was reviewed and negative.       PAST MEDICAL HISTORY     Past Medical History:   Diagnosis Date    Hypertension 2023    Pre-eclampsia 3023         SURGICAL HISTORY       Past Surgical History:   Procedure

## 2025-06-12 ENCOUNTER — RESULTS FOLLOW-UP (OUTPATIENT)
Age: 30
End: 2025-06-12

## 2025-06-12 DIAGNOSIS — Z34.90 PREGNANCY, UNSPECIFIED GESTATIONAL AGE: Primary | ICD-10-CM

## 2025-06-12 LAB
GP B STREP DNA SPEC QL NAA+PROBE: POSITIVE
SPECIMEN SOURCE: ABNORMAL

## 2025-06-13 PROBLEM — Z34.90 PREGNANCY: Chronic | Status: ACTIVE | Noted: 2025-01-03

## 2025-06-16 LAB — ECHO BSA: 2.17 M2

## 2025-06-17 ENCOUNTER — HOSPITAL ENCOUNTER (INPATIENT)
Facility: HOSPITAL | Age: 30
LOS: 5 days | Discharge: HOME | DRG: 540 | End: 2025-06-22
Attending: OBSTETRICS & GYNECOLOGY | Admitting: OBSTETRICS & GYNECOLOGY
Payer: COMMERCIAL

## 2025-06-17 ENCOUNTER — TELEPHONE (OUTPATIENT)
Age: 30
End: 2025-06-17

## 2025-06-17 DIAGNOSIS — Z34.90 PREGNANCY, UNSPECIFIED GESTATIONAL AGE: ICD-10-CM

## 2025-06-17 DIAGNOSIS — O09.299 HISTORY OF PRE-ECLAMPSIA IN PRIOR PREGNANCY, CURRENTLY PREGNANT: Primary | ICD-10-CM

## 2025-06-17 DIAGNOSIS — O30.041 DICHORIONIC DIAMNIOTIC TWIN PREGNANCY IN FIRST TRIMESTER: ICD-10-CM

## 2025-06-17 PROBLEM — Z3A.34 34 WEEKS GESTATION OF PREGNANCY: Status: ACTIVE | Noted: 2025-06-17

## 2025-06-17 LAB
ALBUMIN SERPL-MCNC: 2.4 G/DL (ref 3.5–5)
ALBUMIN/GLOB SERPL: 0.6 (ref 1.1–2.2)
ALP SERPL-CCNC: 199 U/L (ref 45–117)
ALT SERPL-CCNC: 16 U/L (ref 12–78)
ANION GAP SERPL CALC-SCNC: 9 MMOL/L (ref 2–12)
AST SERPL-CCNC: 20 U/L (ref 15–37)
BILIRUB SERPL-MCNC: 0.7 MG/DL (ref 0.2–1)
BUN SERPL-MCNC: 6 MG/DL (ref 6–20)
BUN/CREAT SERPL: 9 (ref 12–20)
CALCIUM SERPL-MCNC: 9.3 MG/DL (ref 8.5–10.1)
CHLORIDE SERPL-SCNC: 106 MMOL/L (ref 97–108)
CO2 SERPL-SCNC: 22 MMOL/L (ref 21–32)
CREAT SERPL-MCNC: 0.66 MG/DL (ref 0.55–1.02)
CREAT UR-MCNC: 19.8 MG/DL
ERYTHROCYTE [DISTWIDTH] IN BLOOD BY AUTOMATED COUNT: 15.8 % (ref 11.5–14.5)
GLOBULIN SER CALC-MCNC: 3.7 G/DL (ref 2–4)
GLUCOSE SERPL-MCNC: 85 MG/DL (ref 65–100)
HCT VFR BLD AUTO: 28.7 % (ref 35–47)
HGB BLD-MCNC: 9.4 G/DL (ref 11.5–16)
MCH RBC QN AUTO: 25.2 PG (ref 26–34)
MCHC RBC AUTO-ENTMCNC: 32.8 G/DL (ref 30–36.5)
MCV RBC AUTO: 76.9 FL (ref 80–99)
NRBC # BLD: 0 K/UL (ref 0–0.01)
NRBC BLD-RTO: 0 PER 100 WBC
PLATELET # BLD AUTO: 173 K/UL (ref 150–400)
PMV BLD AUTO: 11.8 FL (ref 8.9–12.9)
POTASSIUM SERPL-SCNC: 4 MMOL/L (ref 3.5–5.1)
PROT SERPL-MCNC: 6.1 G/DL (ref 6.4–8.2)
PROT UR-MCNC: 7 MG/DL (ref 0–11.9)
PROT/CREAT UR-RTO: 0.4
RBC # BLD AUTO: 3.73 M/UL (ref 3.8–5.2)
SODIUM SERPL-SCNC: 137 MMOL/L (ref 136–145)
WBC # BLD AUTO: 5.8 K/UL (ref 3.6–11)

## 2025-06-17 PROCEDURE — 85027 COMPLETE CBC AUTOMATED: CPT

## 2025-06-17 PROCEDURE — 1120000000 HC RM PRIVATE OB

## 2025-06-17 PROCEDURE — 4500000002 HC ER NO CHARGE

## 2025-06-17 PROCEDURE — 82570 ASSAY OF URINE CREATININE: CPT

## 2025-06-17 PROCEDURE — 6360000002 HC RX W HCPCS: Performed by: OBSTETRICS & GYNECOLOGY

## 2025-06-17 PROCEDURE — 80053 COMPREHEN METABOLIC PANEL: CPT

## 2025-06-17 PROCEDURE — 99285 EMERGENCY DEPT VISIT HI MDM: CPT

## 2025-06-17 PROCEDURE — 84156 ASSAY OF PROTEIN URINE: CPT

## 2025-06-17 PROCEDURE — 6370000000 HC RX 637 (ALT 250 FOR IP): Performed by: OBSTETRICS & GYNECOLOGY

## 2025-06-17 RX ORDER — ONDANSETRON 2 MG/ML
4 INJECTION INTRAMUSCULAR; INTRAVENOUS EVERY 6 HOURS PRN
Status: DISCONTINUED | OUTPATIENT
Start: 2025-06-17 | End: 2025-06-22 | Stop reason: HOSPADM

## 2025-06-17 RX ORDER — BETAMETHASONE SODIUM PHOSPHATE AND BETAMETHASONE ACETATE 3; 3 MG/ML; MG/ML
12 INJECTION, SUSPENSION INTRA-ARTICULAR; INTRALESIONAL; INTRAMUSCULAR; SOFT TISSUE EVERY 24 HOURS
Status: COMPLETED | OUTPATIENT
Start: 2025-06-17 | End: 2025-06-18

## 2025-06-17 RX ORDER — SODIUM CHLORIDE 0.9 % (FLUSH) 0.9 %
5-40 SYRINGE (ML) INJECTION PRN
Status: DISCONTINUED | OUTPATIENT
Start: 2025-06-17 | End: 2025-06-22 | Stop reason: HOSPADM

## 2025-06-17 RX ORDER — ONDANSETRON 4 MG/1
4 TABLET, ORALLY DISINTEGRATING ORAL EVERY 6 HOURS PRN
Status: DISCONTINUED | OUTPATIENT
Start: 2025-06-17 | End: 2025-06-22 | Stop reason: HOSPADM

## 2025-06-17 RX ORDER — SODIUM CHLORIDE 0.9 % (FLUSH) 0.9 %
5-40 SYRINGE (ML) INJECTION EVERY 12 HOURS SCHEDULED
Status: DISCONTINUED | OUTPATIENT
Start: 2025-06-17 | End: 2025-06-22 | Stop reason: HOSPADM

## 2025-06-17 RX ORDER — BUTALBITAL, ACETAMINOPHEN AND CAFFEINE 50; 325; 40 MG/1; MG/1; MG/1
2 TABLET ORAL ONCE
Status: COMPLETED | OUTPATIENT
Start: 2025-06-17 | End: 2025-06-17

## 2025-06-17 RX ORDER — SODIUM CHLORIDE 9 MG/ML
INJECTION, SOLUTION INTRAVENOUS PRN
Status: DISCONTINUED | OUTPATIENT
Start: 2025-06-17 | End: 2025-06-22 | Stop reason: HOSPADM

## 2025-06-17 RX ORDER — BUTALBITAL, ACETAMINOPHEN AND CAFFEINE 50; 325; 40 MG/1; MG/1; MG/1
2 TABLET ORAL ONCE
Status: COMPLETED | OUTPATIENT
Start: 2025-06-18 | End: 2025-06-18

## 2025-06-17 RX ADMIN — BUTALBITAL, ACETAMINOPHEN, AND CAFFEINE 2 TABLET: 50; 325; 40 TABLET ORAL at 18:22

## 2025-06-17 RX ADMIN — BETAMETHASONE SODIUM PHOSPHATE AND BETAMETHASONE ACETATE 12 MG: 3; 3 INJECTION, SUSPENSION INTRA-ARTICULAR; INTRALESIONAL; INTRAMUSCULAR at 19:59

## 2025-06-17 NOTE — H&P
Department of Obstetrics and Gynecology  Attending Obstetrics History and Physical        CHIEF COMPLAINT:  elevated blood pressure    HISTORY OF PRESENT ILLNESS:      The patient is a 30 y.o.  @ 34w3d who is being admitted from [facility] for BMTZ and blood pressure monitoring. She presented with a headache and was diagnosed with pre-e without severe features based on p/c of 0.4 and mildly elevated BP.    Di/di twins  FGRx2  H/o  and desires repeat  H/p pre-e with severe features, delivered at 29 weeks      Primary Provider: Jayjay STOVER0101 (C/s x1 at 29 weeks due to preE with severe features)  Desires Repeat C/s  EDC by [facility]     Pregnancy problems:  Di/Di twin pregnancy (A: Girl, B: Boy)  - Follow up growth scans: FGR x2 (A: Transverse, B: Cephalic) FGR  - 25: Baby A dopplers DEDF 3%. Baby B normal dopplers 15%  - 25: Baby A Transverse, UA dopplers normal. Baby B, cephalic, UA dopplers normal  - 25: Baby A Breech, 1372g, 2%ile. B: Cephalic, 1460g, 5%ile  - Baby A Cephalic, B: Cephalic  - Delivery between 36/0-37/0  - S/p BMZ -     History of c/s x1 at 29 weeks  - Desires repeat c/s     Obesity, pre preg BMI 33     History of pre-eclampsia with severe features, 29 week delivery  - Baby aspirin this pregnancy  - Baseline pre-E labs: Cr 0.77, ALT 20, AST 10, 11.6/35.5, UPC 0.1     Routine OB:  - PNLs: O+/absc neg, Hgb A2', R imm, VZV NI, RPR NR, Hep B Neg, Hep C NR, HIV NR, G/C/T neg, pap , urine cx NG  - Genetic Screening: NIPT low risk: girl/boy  - Anatomy scan: Fetus A: BREECH, maternal right presentation. EFW is 384 g at 15% and AC at 29%. Anterior placenta  Fetus B: Transverse, head to maternal right. EFW is 392 g at 19% and AC at 25%. Anterior placenta  EFW discordance at 2.1   - Vaccines: [ ] Flu  [x] TDAP: 2025  - 1 hr GTT: 97  - Third Tri Labs: 10.6/34.1/247  - [x] GBS positive  - [ ] Fetal presentation     Pain mgmt. in labor:  Feeding:  Circ: Desires  Social: Iqra king

## 2025-06-17 NOTE — ED NOTES
Family History   Problem Relation Age of Onset    Diabetes Mother     Cancer Father         lung       Social History     Socioeconomic History    Marital status: Single     Spouse name: Not on file    Number of children: Not on file    Years of education: Not on file    Highest education level: Not on file   Occupational History    Not on file   Tobacco Use    Smoking status: Never    Smokeless tobacco: Never   Vaping Use    Vaping status: Never Used   Substance and Sexual Activity    Alcohol use: Not Currently    Drug use: Never    Sexual activity: Yes     Partners: Male   Other Topics Concern    Not on file   Social History Narrative    Not on file     Social Drivers of Health     Financial Resource Strain: Patient Declined (3/19/2025)    Overall Financial Resource Strain (CARDIA)     Difficulty of Paying Living Expenses: Patient declined   Food Insecurity: No Food Insecurity (4/25/2025)    Hunger Vital Sign     Worried About Running Out of Food in the Last Year: Never true     Ran Out of Food in the Last Year: Never true   Transportation Needs: No Transportation Needs (4/25/2025)    PRAPARE - Transportation     Lack of Transportation (Medical): No     Lack of Transportation (Non-Medical): No   Physical Activity: Not on file   Stress: Not on file   Social Connections: Not on file   Intimate Partner Violence: Not on file   Housing Stability: Low Risk  (4/25/2025)    Housing Stability Vital Sign     Unable to Pay for Housing in the Last Year: No     Number of Times Moved in the Last Year: 0     Homeless in the Last Year: No         ALLERGIES: Patient has no known allergies.    Review of Systems   All other systems reviewed and are negative.      Vitals:    06/17/25 1742 06/17/25 1752 06/17/25 1810 06/17/25 1825   BP: (!) 142/90 (!) 146/94 (!) 140/95 (!) 133/94   Pulse: 83 78 90 80   Resp:       Temp:       TempSrc:                Physical Exam  Vitals and nursing note reviewed. Exam conducted with a chaperone

## 2025-06-18 PROCEDURE — 99232 SBSQ HOSP IP/OBS MODERATE 35: CPT | Performed by: OBSTETRICS & GYNECOLOGY

## 2025-06-18 PROCEDURE — 6370000000 HC RX 637 (ALT 250 FOR IP): Performed by: OBSTETRICS & GYNECOLOGY

## 2025-06-18 PROCEDURE — 1120000000 HC RM PRIVATE OB

## 2025-06-18 PROCEDURE — 6360000002 HC RX W HCPCS: Performed by: OBSTETRICS & GYNECOLOGY

## 2025-06-18 PROCEDURE — 59025 FETAL NON-STRESS TEST: CPT

## 2025-06-18 PROCEDURE — 59025 FETAL NON-STRESS TEST: CPT | Performed by: OBSTETRICS & GYNECOLOGY

## 2025-06-18 RX ORDER — BUTALBITAL, ACETAMINOPHEN AND CAFFEINE 50; 325; 40 MG/1; MG/1; MG/1
1 TABLET ORAL EVERY 6 HOURS PRN
Status: DISCONTINUED | OUTPATIENT
Start: 2025-06-18 | End: 2025-06-22 | Stop reason: HOSPADM

## 2025-06-18 RX ORDER — ASPIRIN 81 MG/1
81 TABLET, CHEWABLE ORAL DAILY
Status: DISCONTINUED | OUTPATIENT
Start: 2025-06-18 | End: 2025-06-22 | Stop reason: HOSPADM

## 2025-06-18 RX ORDER — SWAB
1 SWAB, NON-MEDICATED MISCELLANEOUS NIGHTLY
Status: DISCONTINUED | OUTPATIENT
Start: 2025-06-18 | End: 2025-06-19 | Stop reason: SDUPTHER

## 2025-06-18 RX ADMIN — ASPIRIN 81 MG: 81 TABLET, CHEWABLE ORAL at 12:56

## 2025-06-18 RX ADMIN — BUTALBITAL, ACETAMINOPHEN, AND CAFFEINE 1 TABLET: 50; 325; 40 TABLET ORAL at 15:28

## 2025-06-18 RX ADMIN — Medication 1 TABLET: at 20:13

## 2025-06-18 RX ADMIN — ONDANSETRON 4 MG: 4 TABLET, ORALLY DISINTEGRATING ORAL at 19:50

## 2025-06-18 RX ADMIN — BUTALBITAL, ACETAMINOPHEN, AND CAFFEINE 2 TABLET: 50; 325; 40 TABLET ORAL at 00:04

## 2025-06-18 RX ADMIN — BETAMETHASONE SODIUM PHOSPHATE AND BETAMETHASONE ACETATE 12 MG: 3; 3 INJECTION, SUSPENSION INTRA-ARTICULAR; INTRALESIONAL; INTRAMUSCULAR at 20:13

## 2025-06-18 ASSESSMENT — PAIN SCALES - GENERAL
PAINLEVEL_OUTOF10: 4
PAINLEVEL_OUTOF10: 2

## 2025-06-18 ASSESSMENT — PAIN DESCRIPTION - DESCRIPTORS: DESCRIPTORS: ACHING;DULL

## 2025-06-18 ASSESSMENT — PAIN DESCRIPTION - ORIENTATION: ORIENTATION: ANTERIOR

## 2025-06-18 ASSESSMENT — PAIN DESCRIPTION - PAIN TYPE: TYPE: ACUTE PAIN

## 2025-06-18 ASSESSMENT — PAIN DESCRIPTION - LOCATION: LOCATION: HEAD

## 2025-06-18 ASSESSMENT — PAIN - FUNCTIONAL ASSESSMENT: PAIN_FUNCTIONAL_ASSESSMENT: ACTIVITIES ARE NOT PREVENTED

## 2025-06-19 ENCOUNTER — ANESTHESIA EVENT (OUTPATIENT)
Facility: HOSPITAL | Age: 30
End: 2025-06-19
Payer: COMMERCIAL

## 2025-06-19 ENCOUNTER — ANESTHESIA (OUTPATIENT)
Facility: HOSPITAL | Age: 30
End: 2025-06-19
Payer: COMMERCIAL

## 2025-06-19 PROBLEM — O34.219 DELIVERY WITH HISTORY OF C-SECTION: Status: ACTIVE | Noted: 2025-06-19

## 2025-06-19 PROBLEM — O14.93 PRE-ECLAMPSIA IN THIRD TRIMESTER: Status: ACTIVE | Noted: 2025-06-19

## 2025-06-19 PROBLEM — O30.043 DICHORIONIC DIAMNIOTIC TWIN PREGNANCY IN THIRD TRIMESTER: Status: ACTIVE | Noted: 2025-01-22

## 2025-06-19 PROBLEM — O28.3 ABNORMAL FETAL ULTRASOUND: Status: ACTIVE | Noted: 2025-06-19

## 2025-06-19 PROBLEM — O36.5990 FETAL GROWTH RESTRICTION ANTEPARTUM: Status: ACTIVE | Noted: 2025-06-19

## 2025-06-19 LAB
ABO + RH BLD: NORMAL
ALBUMIN SERPL-MCNC: 2.3 G/DL (ref 3.5–5)
ALBUMIN/GLOB SERPL: 0.5 (ref 1.1–2.2)
ALP SERPL-CCNC: 182 U/L (ref 45–117)
ALT SERPL-CCNC: 18 U/L (ref 12–78)
ANION GAP SERPL CALC-SCNC: 8 MMOL/L (ref 2–12)
AST SERPL-CCNC: 13 U/L (ref 15–37)
BASOPHILS # BLD: 0 K/UL (ref 0–0.1)
BASOPHILS NFR BLD: 0 % (ref 0–1)
BILIRUB SERPL-MCNC: 0.7 MG/DL (ref 0.2–1)
BLOOD GROUP ANTIBODIES SERPL: NORMAL
BUN SERPL-MCNC: 6 MG/DL (ref 6–20)
BUN/CREAT SERPL: 8 (ref 12–20)
CALCIUM SERPL-MCNC: 9.2 MG/DL (ref 8.5–10.1)
CHLORIDE SERPL-SCNC: 109 MMOL/L (ref 97–108)
CO2 SERPL-SCNC: 20 MMOL/L (ref 21–32)
CREAT SERPL-MCNC: 0.71 MG/DL (ref 0.55–1.02)
DIFFERENTIAL METHOD BLD: ABNORMAL
EOSINOPHIL # BLD: 0.12 K/UL (ref 0–0.4)
EOSINOPHIL NFR BLD: 1 % (ref 0–7)
ERYTHROCYTE [DISTWIDTH] IN BLOOD BY AUTOMATED COUNT: 16 % (ref 11.5–14.5)
ERYTHROCYTE [DISTWIDTH] IN BLOOD BY AUTOMATED COUNT: 16.3 % (ref 11.5–14.5)
GLOBULIN SER CALC-MCNC: 4.4 G/DL (ref 2–4)
GLUCOSE SERPL-MCNC: 126 MG/DL (ref 65–100)
HCT VFR BLD AUTO: 27.7 % (ref 35–47)
HCT VFR BLD AUTO: 29.1 % (ref 35–47)
HGB BLD-MCNC: 8.9 G/DL (ref 11.5–16)
HGB BLD-MCNC: 9.4 G/DL (ref 11.5–16)
IMM GRANULOCYTES # BLD AUTO: 0 K/UL
IMM GRANULOCYTES NFR BLD AUTO: 0 %
LYMPHOCYTES # BLD: 0.98 K/UL (ref 0.8–3.5)
LYMPHOCYTES NFR BLD: 8 % (ref 12–49)
MCH RBC QN AUTO: 24.8 PG (ref 26–34)
MCH RBC QN AUTO: 25.1 PG (ref 26–34)
MCHC RBC AUTO-ENTMCNC: 32.1 G/DL (ref 30–36.5)
MCHC RBC AUTO-ENTMCNC: 32.3 G/DL (ref 30–36.5)
MCV RBC AUTO: 77.2 FL (ref 80–99)
MCV RBC AUTO: 77.6 FL (ref 80–99)
METAMYELOCYTES NFR BLD MANUAL: 1 %
MONOCYTES # BLD: 0.98 K/UL (ref 0–1)
MONOCYTES NFR BLD: 8 % (ref 5–13)
MYELOCYTES NFR BLD MANUAL: 1 %
NEUTS BAND NFR BLD MANUAL: 1 % (ref 0–6)
NEUTS SEG # BLD: 9.96 K/UL (ref 1.8–8)
NEUTS SEG NFR BLD: 80 % (ref 32–75)
NRBC # BLD: 0.02 K/UL (ref 0–0.01)
NRBC # BLD: 0.05 K/UL (ref 0–0.01)
NRBC BLD-RTO: 0.2 PER 100 WBC
NRBC BLD-RTO: 0.4 PER 100 WBC
PLATELET # BLD AUTO: 173 K/UL (ref 150–400)
PLATELET # BLD AUTO: 187 K/UL (ref 150–400)
PMV BLD AUTO: 12.3 FL (ref 8.9–12.9)
POTASSIUM SERPL-SCNC: 3.9 MMOL/L (ref 3.5–5.1)
PROT SERPL-MCNC: 6.7 G/DL (ref 6.4–8.2)
RBC # BLD AUTO: 3.59 M/UL (ref 3.8–5.2)
RBC # BLD AUTO: 3.75 M/UL (ref 3.8–5.2)
RBC MORPH BLD: ABNORMAL
RBC MORPH BLD: ABNORMAL
SODIUM SERPL-SCNC: 137 MMOL/L (ref 136–145)
SPECIMEN EXP DATE BLD: NORMAL
WBC # BLD AUTO: 10.6 K/UL (ref 3.6–11)
WBC # BLD AUTO: 12.3 K/UL (ref 3.6–11)

## 2025-06-19 PROCEDURE — 6370000000 HC RX 637 (ALT 250 FOR IP): Performed by: OBSTETRICS & GYNECOLOGY

## 2025-06-19 PROCEDURE — 6360000002 HC RX W HCPCS: Performed by: OBSTETRICS & GYNECOLOGY

## 2025-06-19 PROCEDURE — 2580000003 HC RX 258: Performed by: OBSTETRICS & GYNECOLOGY

## 2025-06-19 PROCEDURE — 1120000000 HC RM PRIVATE OB

## 2025-06-19 PROCEDURE — 76816 OB US FOLLOW-UP PER FETUS: CPT | Performed by: STUDENT IN AN ORGANIZED HEALTH CARE EDUCATION/TRAINING PROGRAM

## 2025-06-19 PROCEDURE — 2500000003 HC RX 250 WO HCPCS: Performed by: OBSTETRICS & GYNECOLOGY

## 2025-06-19 PROCEDURE — 3700000000 HC ANESTHESIA ATTENDED CARE: Performed by: OBSTETRICS & GYNECOLOGY

## 2025-06-19 PROCEDURE — 99232 SBSQ HOSP IP/OBS MODERATE 35: CPT | Performed by: OBSTETRICS & GYNECOLOGY

## 2025-06-19 PROCEDURE — 59025 FETAL NON-STRESS TEST: CPT

## 2025-06-19 PROCEDURE — 10900ZC DRAINAGE OF AMNIOTIC FLUID, THERAPEUTIC FROM PRODUCTS OF CONCEPTION, OPEN APPROACH: ICD-10-PCS | Performed by: OBSTETRICS & GYNECOLOGY

## 2025-06-19 PROCEDURE — 2709999900 HC NON-CHARGEABLE SUPPLY: Performed by: OBSTETRICS & GYNECOLOGY

## 2025-06-19 PROCEDURE — 85025 COMPLETE CBC W/AUTO DIFF WBC: CPT

## 2025-06-19 PROCEDURE — 3609079900 HC CESAREAN SECTION: Performed by: OBSTETRICS & GYNECOLOGY

## 2025-06-19 PROCEDURE — 4A0HXCZ MEASUREMENT OF PRODUCTS OF CONCEPTION, CARDIAC RATE, EXTERNAL APPROACH: ICD-10-PCS | Performed by: OBSTETRICS & GYNECOLOGY

## 2025-06-19 PROCEDURE — 59514 CESAREAN DELIVERY ONLY: CPT | Performed by: OBSTETRICS & GYNECOLOGY

## 2025-06-19 PROCEDURE — 85027 COMPLETE CBC AUTOMATED: CPT

## 2025-06-19 PROCEDURE — 88307 TISSUE EXAM BY PATHOLOGIST: CPT

## 2025-06-19 PROCEDURE — 2580000003 HC RX 258: Performed by: NURSE ANESTHETIST, CERTIFIED REGISTERED

## 2025-06-19 PROCEDURE — 86901 BLOOD TYPING SEROLOGIC RH(D): CPT

## 2025-06-19 PROCEDURE — 6360000002 HC RX W HCPCS: Performed by: NURSE ANESTHETIST, CERTIFIED REGISTERED

## 2025-06-19 PROCEDURE — 86850 RBC ANTIBODY SCREEN: CPT

## 2025-06-19 PROCEDURE — 6360000002 HC RX W HCPCS: Performed by: ANESTHESIOLOGY

## 2025-06-19 PROCEDURE — 7100000000 HC PACU RECOVERY - FIRST 15 MIN: Performed by: OBSTETRICS & GYNECOLOGY

## 2025-06-19 PROCEDURE — 59025 FETAL NON-STRESS TEST: CPT | Performed by: OBSTETRICS & GYNECOLOGY

## 2025-06-19 PROCEDURE — 76819 FETAL BIOPHYS PROFIL W/O NST: CPT | Performed by: STUDENT IN AN ORGANIZED HEALTH CARE EDUCATION/TRAINING PROGRAM

## 2025-06-19 PROCEDURE — 2500000003 HC RX 250 WO HCPCS: Performed by: NURSE ANESTHETIST, CERTIFIED REGISTERED

## 2025-06-19 PROCEDURE — 86900 BLOOD TYPING SEROLOGIC ABO: CPT

## 2025-06-19 PROCEDURE — 80053 COMPREHEN METABOLIC PANEL: CPT

## 2025-06-19 PROCEDURE — 7100000001 HC PACU RECOVERY - ADDTL 15 MIN: Performed by: OBSTETRICS & GYNECOLOGY

## 2025-06-19 PROCEDURE — 3700000001 HC ADD 15 MINUTES (ANESTHESIA): Performed by: OBSTETRICS & GYNECOLOGY

## 2025-06-19 PROCEDURE — 76820 UMBILICAL ARTERY ECHO: CPT | Performed by: STUDENT IN AN ORGANIZED HEALTH CARE EDUCATION/TRAINING PROGRAM

## 2025-06-19 RX ORDER — SODIUM CHLORIDE 9 MG/ML
INJECTION, SOLUTION INTRAVENOUS PRN
Status: DISCONTINUED | OUTPATIENT
Start: 2025-06-19 | End: 2025-06-22 | Stop reason: HOSPADM

## 2025-06-19 RX ORDER — FENTANYL CITRATE 50 UG/ML
INJECTION, SOLUTION INTRAMUSCULAR; INTRAVENOUS
Status: DISCONTINUED | OUTPATIENT
Start: 2025-06-19 | End: 2025-06-19 | Stop reason: SDUPTHER

## 2025-06-19 RX ORDER — KETOROLAC TROMETHAMINE 30 MG/ML
30 INJECTION, SOLUTION INTRAMUSCULAR; INTRAVENOUS EVERY 6 HOURS
Status: COMPLETED | OUTPATIENT
Start: 2025-06-19 | End: 2025-06-20

## 2025-06-19 RX ORDER — SODIUM CHLORIDE, SODIUM LACTATE, POTASSIUM CHLORIDE, AND CALCIUM CHLORIDE .6; .31; .03; .02 G/100ML; G/100ML; G/100ML; G/100ML
500 INJECTION, SOLUTION INTRAVENOUS ONCE
Status: COMPLETED | OUTPATIENT
Start: 2025-06-19 | End: 2025-06-19

## 2025-06-19 RX ORDER — CETIRIZINE HYDROCHLORIDE 10 MG/1
10 TABLET ORAL DAILY
Status: DISCONTINUED | OUTPATIENT
Start: 2025-06-19 | End: 2025-06-22 | Stop reason: HOSPADM

## 2025-06-19 RX ORDER — MORPHINE SULFATE 1 MG/ML
INJECTION, SOLUTION EPIDURAL; INTRATHECAL; INTRAVENOUS
Status: DISCONTINUED | OUTPATIENT
Start: 2025-06-19 | End: 2025-06-19 | Stop reason: SDUPTHER

## 2025-06-19 RX ORDER — ONDANSETRON 2 MG/ML
INJECTION INTRAMUSCULAR; INTRAVENOUS
Status: DISCONTINUED | OUTPATIENT
Start: 2025-06-19 | End: 2025-06-19 | Stop reason: SDUPTHER

## 2025-06-19 RX ORDER — BISACODYL 10 MG
10 SUPPOSITORY, RECTAL RECTAL DAILY PRN
Status: DISCONTINUED | OUTPATIENT
Start: 2025-06-19 | End: 2025-06-22 | Stop reason: HOSPADM

## 2025-06-19 RX ORDER — OXYCODONE HYDROCHLORIDE 5 MG/1
5 TABLET ORAL EVERY 4 HOURS PRN
Refills: 0 | Status: DISCONTINUED | OUTPATIENT
Start: 2025-06-19 | End: 2025-06-22 | Stop reason: HOSPADM

## 2025-06-19 RX ORDER — FAMOTIDINE 20 MG/1
20 TABLET, FILM COATED ORAL 2 TIMES DAILY
Status: DISCONTINUED | OUTPATIENT
Start: 2025-06-19 | End: 2025-06-22 | Stop reason: HOSPADM

## 2025-06-19 RX ORDER — PROCHLORPERAZINE EDISYLATE 5 MG/ML
10 INJECTION INTRAMUSCULAR; INTRAVENOUS EVERY 6 HOURS PRN
Status: DISCONTINUED | OUTPATIENT
Start: 2025-06-19 | End: 2025-06-22 | Stop reason: HOSPADM

## 2025-06-19 RX ORDER — DEXTROSE, SODIUM CHLORIDE, SODIUM LACTATE, POTASSIUM CHLORIDE, AND CALCIUM CHLORIDE 5; .6; .31; .03; .02 G/100ML; G/100ML; G/100ML; G/100ML; G/100ML
INJECTION, SOLUTION INTRAVENOUS CONTINUOUS
Status: DISCONTINUED | OUTPATIENT
Start: 2025-06-19 | End: 2025-06-22 | Stop reason: HOSPADM

## 2025-06-19 RX ORDER — SWAB
1 SWAB, NON-MEDICATED MISCELLANEOUS DAILY
Status: DISCONTINUED | OUTPATIENT
Start: 2025-06-19 | End: 2025-06-22 | Stop reason: HOSPADM

## 2025-06-19 RX ORDER — OXYTOCIN/RINGER'S LACTATE 30/500 ML
PLASTIC BAG, INJECTION (ML) INTRAVENOUS
Status: DISCONTINUED | OUTPATIENT
Start: 2025-06-19 | End: 2025-06-19 | Stop reason: SDUPTHER

## 2025-06-19 RX ORDER — ZOLPIDEM TARTRATE 5 MG/1
5 TABLET ORAL NIGHTLY PRN
Status: DISCONTINUED | OUTPATIENT
Start: 2025-06-19 | End: 2025-06-22 | Stop reason: HOSPADM

## 2025-06-19 RX ORDER — IBUPROFEN 400 MG/1
800 TABLET, FILM COATED ORAL EVERY 8 HOURS
Status: DISCONTINUED | OUTPATIENT
Start: 2025-06-20 | End: 2025-06-22 | Stop reason: HOSPADM

## 2025-06-19 RX ORDER — MODIFIED LANOLIN
OINTMENT (GRAM) TOPICAL
Status: DISCONTINUED | OUTPATIENT
Start: 2025-06-19 | End: 2025-06-22 | Stop reason: HOSPADM

## 2025-06-19 RX ORDER — DOCUSATE SODIUM 100 MG/1
100 CAPSULE, LIQUID FILLED ORAL 2 TIMES DAILY
Status: DISCONTINUED | OUTPATIENT
Start: 2025-06-19 | End: 2025-06-22 | Stop reason: HOSPADM

## 2025-06-19 RX ORDER — SODIUM CHLORIDE 0.9 % (FLUSH) 0.9 %
5-40 SYRINGE (ML) INJECTION PRN
Status: DISCONTINUED | OUTPATIENT
Start: 2025-06-19 | End: 2025-06-22 | Stop reason: HOSPADM

## 2025-06-19 RX ORDER — EPHEDRINE SULFATE 50 MG/ML
INJECTION INTRAVENOUS
Status: DISCONTINUED | OUTPATIENT
Start: 2025-06-19 | End: 2025-06-19 | Stop reason: SDUPTHER

## 2025-06-19 RX ORDER — ONDANSETRON 2 MG/ML
4 INJECTION INTRAMUSCULAR; INTRAVENOUS EVERY 6 HOURS PRN
Status: DISCONTINUED | OUTPATIENT
Start: 2025-06-19 | End: 2025-06-22 | Stop reason: HOSPADM

## 2025-06-19 RX ORDER — ACETAMINOPHEN 500 MG
1000 TABLET ORAL EVERY 8 HOURS SCHEDULED
Status: DISCONTINUED | OUTPATIENT
Start: 2025-06-19 | End: 2025-06-22 | Stop reason: HOSPADM

## 2025-06-19 RX ORDER — SODIUM CHLORIDE 0.9 % (FLUSH) 0.9 %
5-40 SYRINGE (ML) INJECTION EVERY 12 HOURS SCHEDULED
Status: DISCONTINUED | OUTPATIENT
Start: 2025-06-19 | End: 2025-06-22 | Stop reason: HOSPADM

## 2025-06-19 RX ORDER — KETOROLAC TROMETHAMINE 30 MG/ML
INJECTION, SOLUTION INTRAMUSCULAR; INTRAVENOUS
Status: DISCONTINUED | OUTPATIENT
Start: 2025-06-19 | End: 2025-06-19 | Stop reason: SDUPTHER

## 2025-06-19 RX ORDER — SODIUM CHLORIDE, SODIUM LACTATE, POTASSIUM CHLORIDE, CALCIUM CHLORIDE 600; 310; 30; 20 MG/100ML; MG/100ML; MG/100ML; MG/100ML
INJECTION, SOLUTION INTRAVENOUS
Status: DISCONTINUED | OUTPATIENT
Start: 2025-06-19 | End: 2025-06-19 | Stop reason: SDUPTHER

## 2025-06-19 RX ORDER — SIMETHICONE 80 MG
80 TABLET,CHEWABLE ORAL EVERY 6 HOURS PRN
Status: DISCONTINUED | OUTPATIENT
Start: 2025-06-19 | End: 2025-06-22 | Stop reason: HOSPADM

## 2025-06-19 RX ORDER — ACETAMINOPHEN 500 MG
1000 TABLET ORAL ONCE
Status: COMPLETED | OUTPATIENT
Start: 2025-06-19 | End: 2025-06-19

## 2025-06-19 RX ORDER — BUPIVACAINE HYDROCHLORIDE 7.5 MG/ML
INJECTION, SOLUTION INTRASPINAL
Status: DISCONTINUED | OUTPATIENT
Start: 2025-06-19 | End: 2025-06-19 | Stop reason: SDUPTHER

## 2025-06-19 RX ORDER — ONDANSETRON 4 MG/1
4 TABLET, ORALLY DISINTEGRATING ORAL EVERY 8 HOURS PRN
Status: DISCONTINUED | OUTPATIENT
Start: 2025-06-19 | End: 2025-06-22 | Stop reason: HOSPADM

## 2025-06-19 RX ORDER — SODIUM CHLORIDE, SODIUM LACTATE, POTASSIUM CHLORIDE, CALCIUM CHLORIDE 600; 310; 30; 20 MG/100ML; MG/100ML; MG/100ML; MG/100ML
INJECTION, SOLUTION INTRAVENOUS CONTINUOUS
Status: DISCONTINUED | OUTPATIENT
Start: 2025-06-19 | End: 2025-06-22 | Stop reason: HOSPADM

## 2025-06-19 RX ADMIN — SODIUM CHLORIDE, PRESERVATIVE FREE 5 ML: 5 INJECTION INTRAVENOUS at 19:45

## 2025-06-19 RX ADMIN — EPHEDRINE SULFATE 10 MG: 50 INJECTION INTRAVENOUS at 16:38

## 2025-06-19 RX ADMIN — KETOROLAC TROMETHAMINE 30 MG: 30 INJECTION, SOLUTION INTRAMUSCULAR; INTRAVENOUS at 23:22

## 2025-06-19 RX ADMIN — SODIUM CHLORIDE, POTASSIUM CHLORIDE, SODIUM LACTATE AND CALCIUM CHLORIDE: 600; 310; 30; 20 INJECTION, SOLUTION INTRAVENOUS at 23:31

## 2025-06-19 RX ADMIN — FENTANYL CITRATE 15 MCG: 50 INJECTION INTRAMUSCULAR; INTRAVENOUS at 16:29

## 2025-06-19 RX ADMIN — ZOLPIDEM TARTRATE 5 MG: 5 TABLET ORAL at 00:34

## 2025-06-19 RX ADMIN — PROCHLORPERAZINE EDISYLATE 10 MG: 5 INJECTION INTRAMUSCULAR; INTRAVENOUS at 22:52

## 2025-06-19 RX ADMIN — PHENYLEPHRINE HYDROCHLORIDE 30 MCG/MIN: 10 INJECTION INTRAVENOUS at 16:29

## 2025-06-19 RX ADMIN — SODIUM CHLORIDE, POTASSIUM CHLORIDE, SODIUM LACTATE AND CALCIUM CHLORIDE: 600; 310; 30; 20 INJECTION, SOLUTION INTRAVENOUS at 16:10

## 2025-06-19 RX ADMIN — MORPHINE SULFATE 0.2 MG: 1 INJECTION EPIDURAL; INTRATHECAL; INTRAVENOUS at 16:29

## 2025-06-19 RX ADMIN — ONDANSETRON 4 MG: 2 INJECTION INTRAMUSCULAR; INTRAVENOUS at 16:16

## 2025-06-19 RX ADMIN — ONDANSETRON 4 MG: 2 INJECTION, SOLUTION INTRAMUSCULAR; INTRAVENOUS at 18:49

## 2025-06-19 RX ADMIN — SODIUM CHLORIDE, SODIUM LACTATE, POTASSIUM CHLORIDE, AND CALCIUM CHLORIDE 500 ML: .6; .31; .03; .02 INJECTION, SOLUTION INTRAVENOUS at 22:56

## 2025-06-19 RX ADMIN — Medication 909 MILLI-UNITS/MIN: at 16:57

## 2025-06-19 RX ADMIN — BUPIVACAINE HYDROCHLORIDE WITH DEXTROSE 12 MG: 7.5 INJECTION SUBARACHNOID at 16:29

## 2025-06-19 RX ADMIN — WATER 2000 MG: 1 INJECTION INTRAMUSCULAR; INTRAVENOUS; SUBCUTANEOUS at 16:05

## 2025-06-19 RX ADMIN — KETOROLAC TROMETHAMINE 30 MG: 30 INJECTION, SOLUTION INTRAMUSCULAR; INTRAVENOUS at 17:17

## 2025-06-19 RX ADMIN — SODIUM CHLORIDE, PRESERVATIVE FREE 20 MG: 5 INJECTION INTRAVENOUS at 16:11

## 2025-06-19 RX ADMIN — ACETAMINOPHEN 1000 MG: 500 TABLET ORAL at 16:10

## 2025-06-19 ASSESSMENT — PAIN SCALES - GENERAL
PAINLEVEL_OUTOF10: 2
PAINLEVEL_OUTOF10: 2

## 2025-06-19 ASSESSMENT — PAIN - FUNCTIONAL ASSESSMENT
PAIN_FUNCTIONAL_ASSESSMENT: ACTIVITIES ARE NOT PREVENTED
PAIN_FUNCTIONAL_ASSESSMENT: ACTIVITIES ARE NOT PREVENTED

## 2025-06-19 ASSESSMENT — PAIN DESCRIPTION - ORIENTATION
ORIENTATION: POSTERIOR
ORIENTATION: MID;ANTERIOR

## 2025-06-19 ASSESSMENT — PAIN DESCRIPTION - LOCATION
LOCATION: ABDOMEN;INCISION
LOCATION: HEAD

## 2025-06-19 ASSESSMENT — PAIN DESCRIPTION - DESCRIPTORS
DESCRIPTORS: PRESSURE;SORE
DESCRIPTORS: PRESSURE

## 2025-06-19 NOTE — ANESTHESIA POSTPROCEDURE EVALUATION
Department of Anesthesiology  Postprocedure Note    Patient: Iqra Dunn  MRN: [id number]  YOB: 1995  Date of evaluation: 2025    Procedure Summary       Date: 25 Room / Location: [facility] L&D 01 / [facility] L&D OR    Anesthesia Start: 1613 Anesthesia Stop: 1730    Procedure:  SECTION (E R A S) (Abdomen) Diagnosis:       Delivery by  section using transverse incision of lower segment of uterus      (Delivery by  section using transverse incision of lower segment of uterus [O82])    Surgeons: Cisco Brunner MD Responsible Provider: Bogdan Hyman MD    Anesthesia Type: Spinal ASA Status: 2            Anesthesia Type: Spinal    Rica Phase I:      Rica Phase II:      Anesthesia Post Evaluation    Patient location during evaluation: PACU  Patient participation: complete - patient participated  Level of consciousness: awake and alert  Airway patency: patent  Nausea & Vomiting: no nausea  Cardiovascular status: hemodynamically stable  Respiratory status: acceptable  Hydration status: stable  Pain management: adequate    No notable events documented.

## 2025-06-19 NOTE — ANESTHESIA PROCEDURE NOTES
Spinal Block    Patient location during procedure: OB  Reason for block: primary anesthetic  Staffing  Performed: anesthesiologist   Anesthesiologist: Bogdan Hyman MD  Performed by: Bogdan Hyman MD  Authorized by: Bogdan Hyman MD    Spinal Block  Patient position: sitting  Prep: DuraPrep  Patient monitoring: cardiac monitor, continuous pulse ox, frequent blood pressure checks and oxygen  Approach: midline  Location: L4/L5  Provider prep: mask and sterile gloves  Local infiltration: lidocaine  Needle  Needle type: pencil-tip   Needle gauge: 25 G  Needle length: 4 in  Assessment  Sensory level: T4  Events: None  Swirl obtained: Yes  CSF: clear  Attempts: 1  Hemodynamics: stable  Preanesthetic Checklist  Completed: patient identified, IV checked, site marked, risks and benefits discussed, surgical/procedural consents, equipment checked, pre-op evaluation, timeout performed, anesthesia consent given, oxygen available, monitors applied/VS acknowledged and fire risk safety assessment completed and verbalized

## 2025-06-19 NOTE — ANESTHESIA PRE PROCEDURE
Department of Anesthesiology  Preprocedure Note       Name:  Iqra Dunn   Age:  30 y.o.  :  1995                                          MRN:  [id number]         Date:  2025      Surgeon: Surgeon(s):  Cisco Brunner MD    Procedure: Procedure(s):   SECTION (E R A S)    Medications prior to admission:   Prior to Admission medications    Medication Sig Start Date End Date Taking? Authorizing Provider   Ferrous Sulfate (SLOW FE PO) Take by mouth    ProviderGlenn MD   ondansetron (ZOFRAN) 4 MG tablet Take 1 tablet by mouth every 8 hours as needed for Nausea or Vomiting 2/3/25   Marylu Ro MD   aspirin 81 MG EC tablet Take 1 tablet by mouth daily 25   Christine England MD   Prenatal Multivit-Min-Fe-FA (PRE-TOMÁS PO) Take by mouth    Glenn La MD   cetirizine (ZYRTEC) 10 MG tablet Take 1 tablet by mouth as needed 20   Automatic Reconciliation, Ar       Current medications:    Current Facility-Administered Medications   Medication Dose Route Frequency Provider Last Rate Last Admin    zolpidem (AMBIEN) tablet 5 mg  5 mg Oral Nightly PRN Marylu Ro MD   5 mg at 25 0034    dextrose 5 % in lactated ringers infusion   IntraVENous Continuous Cisco Brunner MD        ceFAZolin (ANCEF) 2,000 mg in sterile water 20 mL IV syringe  2,000 mg IntraVENous Once Cisco Brunner MD        acetaminophen (TYLENOL) tablet 1,000 mg  1,000 mg Oral Once Cisco Brunner MD        famotidine (PEPCID) 20 MG/2ML 20 mg in sodium chloride (PF) 0.9 % 10 mL injection  20 mg IntraVENous Once Cisco Brunner MD        oxytocin (PITOCIN) 30 units in 500 mL infusion Override Pull             aspirin chewable tablet 81 mg  81 mg Oral Daily Theresa Ordonez MD   81 mg at 25 1256    prenatal vitamin 28-0.8 MG tablet 1 tablet  1 each Oral Nightly Theresa Ordonez MD   1 tablet at 25    butalbital-acetaminophen-caffeine (FIORICET, ESGIC) per tablet

## 2025-06-19 NOTE — BRIEF OP NOTE
Operative Note    Name: Iqra Dunn   Medical Record Number: [id number]   YOB: 1995  Today's Date: 2025      Pre-operative Diagnosis/Indication: 1. Di/di twin pregnancy @ 34w5d 2. Pre-eclampsia without severe features 3. IUGR of both babies 4. abnormal dopplers of A 5. History of     Post-operative Diagnosis: same, plus delivered LBFI and LBMI infants    Procedure: RLTCS    Surgeon(s):  Cisco Brunner MD    Anesthesia: Spinal     EBL: 800 cc    IVF: 1000 cc    UOP: 200 cc    Drains: Tinsley catheter to gravity    Prophylactic Antibiotics: Ancef    DVT Prophylaxis: Sequential Compression Devices         Fetal Description: multiple gestation 2     Birth Information:   Information for the patient's :  Shaun GIRL A Iqra [[id number]]   @[id number]@   Information for the patient's :  GILBERTO Dunn [[id number]]   @[id number]@     Weight: pending    Umbilical Cord: A: 3 vc with body cord. B: 3 vc, nuchal x 1    Placenta:  manual removal    Additional intraoperative findings: Normal appearing uterus and fallopian tubes. Unable to exteriorize.     Specimens: placentas with A marked with 1 clamp, B marked with 2 clamps     Implant: none          Complications: none apparent    Stable to PACU    Cisco Brunner MD  2025    Dictation number: [id number]  5:40 PM

## 2025-06-19 NOTE — PROCEDURES
PATIENT: SAMIRA NUNEZ   -  : 1995   -  DOS:2025   -  INTERPRETING PROVIDER:Christine England,   Indication  ========    dichorionic/diamniotic pregnancy, hx pre-eclampsia, FGR (both A and B)    Method  ======    Transabdominal ultrasound examination. View: Sufficient    Pregnancy  =========    twin pregnancy. Number of fetuses: 2. Dichorionic-diamniotic    Dating  ======    LMP on: 10/26/2024  GA by LMP 33 w + 4 d  FLORENTINO by LMP: 2025  Prior assessment on: 2024  Prior assessment by: From outside records  GA by prior assessment 34 w + 4 d  FLORENTINO by prior assessment: 2025  Ultrasound examination on: 2025  GA by U/S based upon: AC, BPD, Femur, HC  GA by U/S 31 w + 5 d  FLORENTINO by U/S: 8/15/2025  GA by U/S based upon (Fetus 2): AC, BPD, Femur, HC  GA by U/S (Fetus 2) 32 w + 5 d  FLORENTINO by U/S (Fetus 2): 2025  Assigned: based on stated FLORENTINO (on 2024, From outside records), selected on 2025  Assigned GA 34 w + 4 d  Assigned FLORENTINO: 2025    Fetus 1: Fetal Biometry  ============    Standard  BPD 76.1 mm 30w 4d <1% Hadlock  .4 mm 36w 5d 89% Candelario  .5 mm 33w 2d 3% Hadlock  .0 mm 31w 2d <1% Hadlock  Femur 61.4 mm 31w 6d 2% Hadlock  EFW 1,800 g 31w 2d 2% Hadlock  EFW discordance 12.8 %  EFW (lb) 3 lb  EFW (oz) 15 oz  EFW by: Hadlock (BPD-HC-AC-FL)  Other Structures   bpm    Fetus 1: General Evaluation  ==============    Cardiac activity present.  bpm. Fetal movements: visualized. Presentation: cephalic, maternal right  Placenta: Placental site: anterior, appropriate distance from the internal os  Umbilical cord: Cord vessels: 3 vessel cord. Insertion site: normal insertion  Amniotic fluid: Amount of AF: normal. MVP 3.4 cm    Fetus 1: Fetal Anatomy  ===========    Stomach: normal  Kidneys: normal  Bladder: normal  Cervical spine: SUBOPTIMAL  Thoracic spine: SUBOPTIMAL  Lumbar spine: SUBOPTIMAL  Sacral spine: SUBOPTIMAL  Rt fingers: normal  Lt hand: NOT

## 2025-06-20 LAB
ERYTHROCYTE [DISTWIDTH] IN BLOOD BY AUTOMATED COUNT: 16.8 % (ref 11.5–14.5)
HCT VFR BLD AUTO: 24.4 % (ref 35–47)
HGB BLD-MCNC: 7.7 G/DL (ref 11.5–16)
MCH RBC QN AUTO: 24.9 PG (ref 26–34)
MCHC RBC AUTO-ENTMCNC: 31.6 G/DL (ref 30–36.5)
MCV RBC AUTO: 79 FL (ref 80–99)
NRBC # BLD: 0.04 K/UL (ref 0–0.01)
NRBC BLD-RTO: 0.4 PER 100 WBC
PLATELET # BLD AUTO: 142 K/UL (ref 150–400)
PMV BLD AUTO: 11.9 FL (ref 8.9–12.9)
RBC # BLD AUTO: 3.09 M/UL (ref 3.8–5.2)
WBC # BLD AUTO: 10.3 K/UL (ref 3.6–11)

## 2025-06-20 PROCEDURE — 6360000002 HC RX W HCPCS: Performed by: OBSTETRICS & GYNECOLOGY

## 2025-06-20 PROCEDURE — 36415 COLL VENOUS BLD VENIPUNCTURE: CPT

## 2025-06-20 PROCEDURE — 99024 POSTOP FOLLOW-UP VISIT: CPT | Performed by: OBSTETRICS & GYNECOLOGY

## 2025-06-20 PROCEDURE — 85027 COMPLETE CBC AUTOMATED: CPT

## 2025-06-20 PROCEDURE — 51701 INSERT BLADDER CATHETER: CPT

## 2025-06-20 PROCEDURE — 6370000000 HC RX 637 (ALT 250 FOR IP): Performed by: OBSTETRICS & GYNECOLOGY

## 2025-06-20 PROCEDURE — 1120000000 HC RM PRIVATE OB

## 2025-06-20 PROCEDURE — 2580000003 HC RX 258: Performed by: OBSTETRICS & GYNECOLOGY

## 2025-06-20 PROCEDURE — 51798 US URINE CAPACITY MEASURE: CPT

## 2025-06-20 RX ADMIN — KETOROLAC TROMETHAMINE 30 MG: 30 INJECTION, SOLUTION INTRAMUSCULAR; INTRAVENOUS at 17:46

## 2025-06-20 RX ADMIN — Medication 1 TABLET: at 09:40

## 2025-06-20 RX ADMIN — PROCHLORPERAZINE EDISYLATE 10 MG: 5 INJECTION INTRAMUSCULAR; INTRAVENOUS at 05:07

## 2025-06-20 RX ADMIN — PROCHLORPERAZINE EDISYLATE 10 MG: 5 INJECTION INTRAMUSCULAR; INTRAVENOUS at 11:19

## 2025-06-20 RX ADMIN — KETOROLAC TROMETHAMINE 30 MG: 30 INJECTION, SOLUTION INTRAMUSCULAR; INTRAVENOUS at 04:45

## 2025-06-20 RX ADMIN — FAMOTIDINE 20 MG: 20 TABLET, FILM COATED ORAL at 20:11

## 2025-06-20 RX ADMIN — IBUPROFEN 800 MG: 400 TABLET, FILM COATED ORAL at 23:11

## 2025-06-20 RX ADMIN — FAMOTIDINE 20 MG: 20 TABLET, FILM COATED ORAL at 09:40

## 2025-06-20 RX ADMIN — DOCUSATE SODIUM 100 MG: 100 CAPSULE, LIQUID FILLED ORAL at 09:40

## 2025-06-20 RX ADMIN — ASPIRIN 81 MG: 81 TABLET, CHEWABLE ORAL at 09:39

## 2025-06-20 RX ADMIN — CETIRIZINE HYDROCHLORIDE 10 MG: 10 TABLET, FILM COATED ORAL at 09:40

## 2025-06-20 RX ADMIN — KETOROLAC TROMETHAMINE 30 MG: 30 INJECTION, SOLUTION INTRAMUSCULAR; INTRAVENOUS at 09:40

## 2025-06-20 RX ADMIN — ACETAMINOPHEN 1000 MG: 500 TABLET ORAL at 14:45

## 2025-06-20 RX ADMIN — ACETAMINOPHEN 1000 MG: 500 TABLET ORAL at 23:11

## 2025-06-20 RX ADMIN — SODIUM CHLORIDE, POTASSIUM CHLORIDE, SODIUM LACTATE AND CALCIUM CHLORIDE: 600; 310; 30; 20 INJECTION, SOLUTION INTRAVENOUS at 03:10

## 2025-06-20 RX ADMIN — DOCUSATE SODIUM 100 MG: 100 CAPSULE, LIQUID FILLED ORAL at 20:11

## 2025-06-20 ASSESSMENT — PAIN DESCRIPTION - LOCATION
LOCATION: ABDOMEN;INCISION
LOCATION: PERINEUM
LOCATION: ABDOMEN;INCISION

## 2025-06-20 ASSESSMENT — PAIN SCALES - GENERAL
PAINLEVEL_OUTOF10: 2
PAINLEVEL_OUTOF10: 0
PAINLEVEL_OUTOF10: 4

## 2025-06-20 ASSESSMENT — PAIN - FUNCTIONAL ASSESSMENT: PAIN_FUNCTIONAL_ASSESSMENT: ACTIVITIES ARE NOT PREVENTED

## 2025-06-20 ASSESSMENT — PAIN DESCRIPTION - DESCRIPTORS
DESCRIPTORS: ACHING
DESCRIPTORS: CRAMPING;PRESSURE

## 2025-06-20 ASSESSMENT — PAIN DESCRIPTION - ORIENTATION: ORIENTATION: MID;LOWER

## 2025-06-20 NOTE — OP NOTE
[address]  [address]                            OPERATIVE REPORT      PATIENT NAME: JOSE NUNEZ           : 1995  MED REC NO: [id number]                       ROOM: 323  ACCOUNT NO: [id number]                       ADMIT DATE: 2025  PROVIDER: Cisco Brunner MD    DATE OF SERVICE:  2025    PREOPERATIVE DIAGNOSES:       1. Di-di twins pregnancy at 34 weeks 5 days.     2. Preeclampsia without severe features.     3. Intrauterine growth restriction of both babies.     4. Abnormal Dopplers of baby A.     5. History of  section.    POSTOPERATIVE DIAGNOSES:       1. Di-di twins pregnancy at 34 weeks 5 days.     2. Preeclampsia without severe features.     3. Intrauterine growth restriction of both babies.     4. Abnormal Dopplers of baby A.     5. History of  section.     6. Live-born female infant and live-born male infant.    PROCEDURES PERFORMED:  Repeat low-transverse  section via Pfannenstiel skin incision.    SURGEON:  Cisco Brunner MD    ASSISTANT:  Nurse assigned to the room.    ANESTHESIA:  Spinal.    ESTIMATED BLOOD LOSS:  800 mL.    QBL pending at the time of dictation.    SPECIMENS REMOVED:  Placentas with A being marked with 1 clamp and B marked with 2 clamps.    INTRAOPERATIVE FINDINGS:  Additional findings:  Normal-appearing uterus and fallopian tubes.  Unable to exteriorize the uterus.     COMPLICATIONS:  None apparent.    IMPLANTS:  None.    INDICATIONS:       1. Di-di twins pregnancy at 34 weeks 5 days.     2. Preeclampsia without severe features.     3. Intrauterine growth restriction of both babies.     4. Abnormal Dopplers of baby A.     5. History of  section.    DESCRIPTION OF PROCEDURE:  The patient was taken to the operating room, placed on the operating table in upright position and had her spinal placed.  She was then placed supine and prepped and draped in

## 2025-06-20 NOTE — LACTATION NOTE
Per nurse, mom has started pumping to stimulate milk production for infants in the NICU. Mom sleeping at the time of my visit. Nurse states mom has been doing well and she will call for assistance if needed.     1515 mom pumped a few hours ago and colostrum in room. Pelon it up in syringe and labeled with date and time pumped. Took it to the NICU for babies. Educated mom on milk storage. She continues to pump every 2.5 -3 hours to stimulate milk production. Encouraged breast massage as she pumps to further support production.

## 2025-06-21 LAB
ERYTHROCYTE [DISTWIDTH] IN BLOOD BY AUTOMATED COUNT: 16.1 % (ref 11.5–14.5)
HCT VFR BLD AUTO: 23.7 % (ref 35–47)
HGB BLD-MCNC: 7.6 G/DL (ref 11.5–16)
MCH RBC QN AUTO: 25.2 PG (ref 26–34)
MCHC RBC AUTO-ENTMCNC: 32.1 G/DL (ref 30–36.5)
MCV RBC AUTO: 78.5 FL (ref 80–99)
NRBC # BLD: 0.03 K/UL (ref 0–0.01)
NRBC BLD-RTO: 0.3 PER 100 WBC
PLATELET # BLD AUTO: 164 K/UL (ref 150–400)
PMV BLD AUTO: 11.7 FL (ref 8.9–12.9)
RBC # BLD AUTO: 3.02 M/UL (ref 3.8–5.2)
WBC # BLD AUTO: 8.8 K/UL (ref 3.6–11)

## 2025-06-21 PROCEDURE — 2500000003 HC RX 250 WO HCPCS: Performed by: OBSTETRICS & GYNECOLOGY

## 2025-06-21 PROCEDURE — 6370000000 HC RX 637 (ALT 250 FOR IP): Performed by: OBSTETRICS & GYNECOLOGY

## 2025-06-21 PROCEDURE — 1120000000 HC RM PRIVATE OB

## 2025-06-21 PROCEDURE — 85027 COMPLETE CBC AUTOMATED: CPT

## 2025-06-21 PROCEDURE — APPNB15 APP NON BILLABLE TIME 0-15 MINS: Performed by: ADVANCED PRACTICE MIDWIFE

## 2025-06-21 RX ADMIN — Medication 1 TABLET: at 08:40

## 2025-06-21 RX ADMIN — ACETAMINOPHEN 1000 MG: 500 TABLET ORAL at 21:03

## 2025-06-21 RX ADMIN — ACETAMINOPHEN 1000 MG: 500 TABLET ORAL at 06:57

## 2025-06-21 RX ADMIN — SODIUM CHLORIDE, PRESERVATIVE FREE 10 ML: 5 INJECTION INTRAVENOUS at 08:41

## 2025-06-21 RX ADMIN — IBUPROFEN 800 MG: 400 TABLET, FILM COATED ORAL at 18:16

## 2025-06-21 RX ADMIN — FAMOTIDINE 20 MG: 20 TABLET, FILM COATED ORAL at 08:38

## 2025-06-21 RX ADMIN — OXYCODONE 5 MG: 5 TABLET ORAL at 08:39

## 2025-06-21 RX ADMIN — SIMETHICONE 80 MG: 80 TABLET, CHEWABLE ORAL at 21:02

## 2025-06-21 RX ADMIN — DOCUSATE SODIUM 100 MG: 100 CAPSULE, LIQUID FILLED ORAL at 21:02

## 2025-06-21 RX ADMIN — ACETAMINOPHEN 1000 MG: 500 TABLET ORAL at 13:19

## 2025-06-21 RX ADMIN — OXYCODONE 5 MG: 5 TABLET ORAL at 13:19

## 2025-06-21 RX ADMIN — FAMOTIDINE 20 MG: 20 TABLET, FILM COATED ORAL at 21:02

## 2025-06-21 RX ADMIN — CETIRIZINE HYDROCHLORIDE 10 MG: 10 TABLET, FILM COATED ORAL at 08:38

## 2025-06-21 RX ADMIN — OXYCODONE 5 MG: 5 TABLET ORAL at 20:19

## 2025-06-21 RX ADMIN — ASPIRIN 81 MG: 81 TABLET, CHEWABLE ORAL at 08:38

## 2025-06-21 RX ADMIN — IBUPROFEN 800 MG: 400 TABLET, FILM COATED ORAL at 06:57

## 2025-06-21 RX ADMIN — DOCUSATE SODIUM 100 MG: 100 CAPSULE, LIQUID FILLED ORAL at 08:39

## 2025-06-21 ASSESSMENT — PAIN SCALES - GENERAL
PAINLEVEL_OUTOF10: 4
PAINLEVEL_OUTOF10: 7
PAINLEVEL_OUTOF10: 5

## 2025-06-21 ASSESSMENT — PAIN DESCRIPTION - LOCATION
LOCATION: ABDOMEN;INCISION
LOCATION: ABDOMEN

## 2025-06-21 ASSESSMENT — PAIN DESCRIPTION - DESCRIPTORS: DESCRIPTORS: CRAMPING;HEAVINESS

## 2025-06-22 VITALS
RESPIRATION RATE: 18 BRPM | HEART RATE: 82 BPM | OXYGEN SATURATION: 99 % | TEMPERATURE: 97.9 F | DIASTOLIC BLOOD PRESSURE: 85 MMHG | SYSTOLIC BLOOD PRESSURE: 132 MMHG

## 2025-06-22 PROCEDURE — APPNB30 APP NON BILLABLE TIME 0-30 MINS

## 2025-06-22 PROCEDURE — 6370000000 HC RX 637 (ALT 250 FOR IP): Performed by: OBSTETRICS & GYNECOLOGY

## 2025-06-22 RX ORDER — OXYCODONE HYDROCHLORIDE 5 MG/1
5 TABLET ORAL EVERY 4 HOURS PRN
Qty: 5 TABLET | Refills: 0 | Status: SHIPPED | OUTPATIENT
Start: 2025-06-22 | End: 2025-06-25

## 2025-06-22 RX ORDER — SIMETHICONE 80 MG
80 TABLET,CHEWABLE ORAL EVERY 6 HOURS PRN
Qty: 30 TABLET | Refills: 0 | Status: SHIPPED | OUTPATIENT
Start: 2025-06-22

## 2025-06-22 RX ORDER — PSEUDOEPHEDRINE HCL 30 MG
100 TABLET ORAL 2 TIMES DAILY
Qty: 30 CAPSULE | Refills: 0 | Status: SHIPPED | OUTPATIENT
Start: 2025-06-22

## 2025-06-22 RX ORDER — IBUPROFEN 800 MG/1
800 TABLET, FILM COATED ORAL EVERY 8 HOURS
Qty: 90 TABLET | Refills: 0 | Status: SHIPPED | OUTPATIENT
Start: 2025-06-22

## 2025-06-22 RX ADMIN — ACETAMINOPHEN 1000 MG: 500 TABLET ORAL at 06:32

## 2025-06-22 RX ADMIN — ASPIRIN 81 MG: 81 TABLET, CHEWABLE ORAL at 09:14

## 2025-06-22 RX ADMIN — DOCUSATE SODIUM 100 MG: 100 CAPSULE, LIQUID FILLED ORAL at 09:14

## 2025-06-22 RX ADMIN — CETIRIZINE HYDROCHLORIDE 10 MG: 10 TABLET, FILM COATED ORAL at 09:14

## 2025-06-22 RX ADMIN — IBUPROFEN 800 MG: 400 TABLET, FILM COATED ORAL at 04:39

## 2025-06-22 RX ADMIN — IBUPROFEN 800 MG: 400 TABLET, FILM COATED ORAL at 09:13

## 2025-06-22 RX ADMIN — FAMOTIDINE 20 MG: 20 TABLET, FILM COATED ORAL at 09:14

## 2025-06-22 RX ADMIN — OXYCODONE 5 MG: 5 TABLET ORAL at 00:22

## 2025-06-22 RX ADMIN — Medication 1 TABLET: at 09:14

## 2025-06-22 RX ADMIN — OXYCODONE 5 MG: 5 TABLET ORAL at 06:34

## 2025-06-22 ASSESSMENT — PAIN DESCRIPTION - LOCATION
LOCATION: ABDOMEN
LOCATION: ABDOMEN
LOCATION: BREAST
LOCATION: ABDOMEN

## 2025-06-22 ASSESSMENT — PAIN SCALES - GENERAL
PAINLEVEL_OUTOF10: 8
PAINLEVEL_OUTOF10: 7
PAINLEVEL_OUTOF10: 5
PAINLEVEL_OUTOF10: 8

## 2025-06-22 ASSESSMENT — PAIN DESCRIPTION - DESCRIPTORS
DESCRIPTORS: ACHING

## 2025-06-22 ASSESSMENT — PAIN DESCRIPTION - ORIENTATION
ORIENTATION: LOWER
ORIENTATION: RIGHT
ORIENTATION: LOWER
ORIENTATION: LOWER

## 2025-06-22 NOTE — PROGRESS NOTES
.Bedside and Verbal shift change report given to Demond ROCHA RN (oncoming nurse) by Eryn MAJANO RN (offgoing nurse). Report included the following information Nurse Handoff Report, Adult Overview, Surgery Report, Intake/Output, MAR, Recent Results, and Med Rec Status.      2020 pt to NICU  2115 returned from NICU  
1600-1930-I precepted NAVIN Dobbs RN and I agree with her assessment and plan  
1610- Patient taken from L&D room 12 to OR for  section.     - TRANSFER - OUT REPORT:    Verbal report given to DWAYNE Vega on Iqra Dunn  being transferred to [facility] for routine progression of patient care       Report consisted of patient's Situation, Background, Assessment and   Recommendations(SBAR).     Information from the following report(s) Nurse Handoff Report, Surgery Report, Intake/Output, MAR, and Recent Results was reviewed with the receiving nurse.           Lines:   Peripheral IV 25 Proximal;Right Forearm (Active)        Opportunity for questions and clarification was provided.      Patient transported with:  Registered Nurse         
1950 Bedside and verbal shift change report given to Trevon by Hakeem. Report included the following information: Nurse Handoff Report  
6/17/2025  5:26 PM Pt presents to [facility] with c/o HA unresolved by tylenol. She rates this HA a 5/10. She also reports one instance of \"seeing black spots\" while at work today. Denies LUQ pain. Denies LOF/VB, endorses FM. She has a hx of PreE with her first pregnancy, was delivered via c/s at 29 weeks. She is scheduled for a repeat c/s for her twins on June 30th.   1741: Dr. Christy notified about patient, verbal order for labs.   1800: Dr. Christy at bedside, plan to keep overnight for obs, labs pending.   1822: Fioricet given (see MAR).  1930: Per Dr. Christy, plan to admit overnight to antepartum unit and give betamethasone.  1959: First dose of beta given (see MAR).  
730- Bedside and Verbal shift change report given to JOE Jefferson RN (oncoming nurse) by JOE William RN (offgoing nurse). Report included the following information Nurse Handoff Report, Index, Adult Overview, Surgery Report, Intake/Output, MAR, Recent Results, and Med Rec Status.    
730- Bedside and Verbal shift change report given to JOE Jefferson RN (oncoming nurse) by LINDA Heninng RN (offgoing nurse). Report included the following information Nurse Handoff Report, Index, Adult Overview, Surgery Report, Intake/Output, MAR, Recent Results, and Med Rec Status.      1120- I have reviewed discharge instruction and reviewed medication times. Patient given copy of discharge instructions. Patient verbalizes understanding and denies any further questions at this time.  Frank approved pt using at home BP monitor for BP checks rather than Moms Under Pressure bags. BP monitoring teaching completed along with log provided, pt has no further questions.   
750- Bedside and Verbal shift change report given to JOE Jefferson RN (oncoming nurse) by Kay ROBB RN (offgoing nurse). Report included the following information Nurse Handoff Report, Index, Adult Overview, Surgery Report, Intake/Output, MAR, Recent Results, and Med Rec Status.      1120- Pt unable to void in 6hr timeframe from reyes removal. Pepperment oil provided.    1200- Bladderscan shows 350cc urine in bladder, straight cath with another floor RN completed, 400cc urine collected. Due to void next at 1800.    1500- Pt /88, RN not made aware of BP reading.     1559- RN aware of previous BP, BP check 141/85. Pt denies headache, vision changes, N/V, and epigastric pain.    1745- Pt voided 900cc.  
Ante Partum Progress Note    Iqra Dunn  34w4d    Assessment: 34w4d   Di/Di twin gestation: following with MFM   - desires RLTCS for delivery    Pre-eclampsia without severe features: new dx as of yesterday. Bps mild range elevated to normal overnight. Urine p/c 0.4, serology WNL.  - cont to monitor Bps and for any si/sx of progression to severe disease  - complete BMZ course, second dose today at 8pm  - MFM consult and US today        Plan:  Continue hospitalization with hospitalized bedrest, Complete  steroid course, and Maternal fetal medicine consultation today    Orders/Charges: Medium and Non Stress Test    S:  Patient states she has no new complaints. She did well overnight. She reports her headache from admission is improved. Denies visual changes RUQ pain. Denies abdominal pain, Active fetal movements.     Vitals:  /83   Pulse 86   Temp 98.9 °F (37.2 °C) (Oral)   Resp 16   LMP 10/26/2024   SpO2 100%   Temp (24hrs), Av.8 °F (37.1 °C), Min:98.6 °F (37 °C), Max:98.9 °F (37.2 °C)      Last 24hr Input/Output:  No intake or output data in the 24 hours ending 25 1756     25 Non stress test:    AM  A) baseline 130, mod karina, no accelerations present that meet 15x15 criteria, no decels  B) baseline 135, mod karina, accelerations present, no decels    PM  A) baseline 120-130, mod karina, accelerations present, no decels  B) baseline 120-130, mod karina, accelerations present, no decels    No data found. No data found.         Exam:  Patient without distress.     Abdomen, fundus soft non-tender     Extremities, no redness or tenderness               Labs:     Lab Results   Component Value Date/Time    WBC 5.8 2025 05:57 PM    WBC 8.7 2025 10:29 PM    WBC 7.2 2025 08:27 PM    WBC 6.6 2025 09:21 AM    WBC 4.9 2025 09:31 AM    HGB 9.4 2025 05:57 PM    HGB 10.6 2025 10:29 PM    HGB 9.6 2025 08:27 PM    HGB 10.6 2025 09:21 AM    HGB 11.6 
Bedside and Verbal shift change report given to JOE Garvin RN  (oncoming nurse) by Kay RN  (offgoing nurse). Report included the following information SBAR, Kardex, Intake/Output, MAR, and Recent Results.     MD Brunner called spoke with off going RN, orders to repeat 1hr NST @ 0900. M appt @ 1115.     Pt updated on plan of care. Pt to remain NPO until appt with [facility].       0857- pt placed on fetal monitors. RN at bedside adjusting ultrasound. Pt denies any complaints at this time. Reports positive fetal movement.     0906- Pt turn left side; adjusting ultrasound.     0913- pt turn right side; adjusting ultrasound.     0919- Pt turn supine, adjusting ultrasound.     0945- Rn at bedside adjusting     1000- RN at bedside adjusting    1020- pt taken off feal monitors.     1222- spoke with L&D charge made aware pt is on way back to unit from [facility] appt. Will call when room is ready for transfer.   Pt updated plan to transfer to L&D once bed is available.     1306-MD Brunner called transferred into pt's room     1445- pt updated on plan of care, transferring to L&D at 1530. IV access established, CBC & T&S obtained and sent to lab via tube system     1507- MD Brunner at bedside, OR consent obtained & placed on chart.     1530- pt transferred to L&D for C?S LD RM 12.     1542- TRANSFER - OUT REPORT:    Verbal report given to Sravanthi RICE  on Iqra Dunn  being transferred to L&D for routine progression of patient care       Report consisted of patient's Situation, Background, Assessment and   Recommendations(SBAR).     Information from the following report(s) Nurse Handoff Report, Adult Overview, MAR, and Recent Results was reviewed with the receiving nurse.           Lines:   Peripheral IV 06/19/25 Proximal;Right Forearm (Active)        Opportunity for questions and clarification was provided.      Patient transported with:  Registered Nurse and Tech         
Bedside and Verbal shift change report given to JOE Garvin RN  (oncoming nurse) by OMID Jack RN  (offgoing nurse). Report included the following information SBAR, Kardex, Intake/Output, MAR, and Recent Results.       1433- spoke with MD Ordonez, orders to place pt on fetal monitors for 1 hr. MD to review strip.     1440- 1506-  pt placed on fetal monitors. Pt denies any cx. Reports HA with moving head rates 1-2 /10. RN at bedside during this duration     1512- RN at bedside adjusting     1530- RN at bedside, pt adjusted in bed, HOB raised up.     1553- MD Ordonez made aware pt has been on fetal monitors for an hour.     1612- MD England perfect served     1629- Strip reviewed with MD England, orders to increase fetal monitors to TID for 1 hr, repeat BPP tomorrow.     1631- Pt updated on plan of care.       
Bedside and Verbal shift change report given to Patrick RN (oncoming nurse) by Alejandra RN (offgoing nurse). Report included the following information Nurse Handoff Report, Adult Overview, Intake/Output, and MAR.     2245.Noted with persistent vomiting and urine output 50 ml.concentrated since 1945,informed midwife Trevin with order to give Compazine 10 mg.IV and bolus of Ringers Lactate 500 ml.    2252.Compazine 10 mg.given IV and lactated ringers 500 ml started at once.     0205.As per Nursing Supervisor,the laboratory will only run STAT labs.Routine labs will be sent to outside laboratory so it will be delayed.    
Bedside and Verbal shift change report given to Patrick RN (oncoming nurse) by Becky RICE (offgoing nurse). Report included the following information Nurse Handoff Report, Adult Overview, Intake/Output, and MAR.     
Bedside shift change report given to Joi Henning RN (oncoming nurse) by Mirna Del Rio RN (offgoing nurse). Report included the following information Nurse Handoff Report, Intake/Output, MAR, Recent Results, and Event Log.    
MATERNAL FETAL MEDICINE   PROGRESS NOTE    Iqra Dunn is a 30 y.o. female  at 34w5d admitted for preE without SF   - Patient presented to the emergency room on the  patient was noted to have mild to moderate range blood pressures upon admission. She had no severe range blood pressures. Her preeclampsia labs were significant for an elevated PC ratio of 0.4 otherwise remaining preeclampsia labs on admission were normal. On admission patient was started on Procardia 30 XL for blood pressure management and since then blood pressures have been okay.         Patient Vitals for the past 24 hrs:   Temp Pulse Resp BP SpO2   25 0742 97.6 °F (36.4 °C) 71 15 (!) 143/80 96 %   25 0421 97.9 °F (36.6 °C) 79 16 135/84 95 %   25 2329 97.9 °F (36.6 °C) 73 18 121/76 98 %   25 1934 99 °F (37.2 °C) 90 18 128/81 100 %   25 1636 98.9 °F (37.2 °C) 86 16 129/83 100 %   25 1116 -- 89 -- 132/87 --       Gen: Comfortable, NAD  Resp: Comfortable respirations  CV: Well perfused  Abd: Gravid, NT, ND  Ext: Moving all extremities well  Neuro: Alert, interactive, appropriate    Recent Results (from the past 24 hours)   CBC    Collection Time: 25  5:51 AM   Result Value Ref Range    WBC 10.6 3.6 - 11.0 K/uL    RBC 3.59 (L) 3.80 - 5.20 M/uL    Hemoglobin 8.9 (L) 11.5 - 16.0 g/dL    Hematocrit 27.7 (L) 35.0 - 47.0 %    MCV 77.2 (L) 80.0 - 99.0 FL    MCH 24.8 (L) 26.0 - 34.0 PG    MCHC 32.1 30.0 - 36.5 g/dL    RDW 16.0 (H) 11.5 - 14.5 %    Platelets 173 150 - 400 K/uL    MPV 12.3 8.9 - 12.9 FL    Nucleated RBCs 0.2 (H) 0  WBC    nRBC 0.02 (H) 0.00 - 0.01 K/uL   Comprehensive Metabolic Panel    Collection Time: 25  5:51 AM   Result Value Ref Range    Sodium 137 136 - 145 mmol/L    Potassium 3.9 3.5 - 5.1 mmol/L    Chloride 109 (H) 97 - 108 mmol/L    CO2 20 (L) 21 - 32 mmol/L    Anion Gap 8 2 - 12 mmol/L    Glucose 126 (H) 65 - 100 mg/dL    BUN 6 6 - 20 MG/DL    Creatinine 
MATERNAL FETAL MEDICINE   PROGRESS NOTE    Irqa Dunn is a 30 y.o. female  at 34w5d admitted for preE with SF and FGR x2 in di/di TIUP.         Patient Vitals for the past 24 hrs:   Temp Pulse Resp BP SpO2   25 0742 97.6 °F (36.4 °C) 71 15 (!) 143/80 96 %   25 0421 97.9 °F (36.6 °C) 79 16 135/84 95 %   25 2329 97.9 °F (36.6 °C) 73 18 121/76 98 %   25 1934 99 °F (37.2 °C) 90 18 128/81 100 %   25 1636 98.9 °F (37.2 °C) 86 16 129/83 100 %       Gen: Comfortable, NAD  Resp: Comfortable respirations  CV: Well perfused  Abd: Gravid, NT, ND  Ext: Moving all extremities well  Neuro: Alert, interactive, appropriate    Recent Results (from the past 24 hours)   CBC    Collection Time: 25  5:51 AM   Result Value Ref Range    WBC 10.6 3.6 - 11.0 K/uL    RBC 3.59 (L) 3.80 - 5.20 M/uL    Hemoglobin 8.9 (L) 11.5 - 16.0 g/dL    Hematocrit 27.7 (L) 35.0 - 47.0 %    MCV 77.2 (L) 80.0 - 99.0 FL    MCH 24.8 (L) 26.0 - 34.0 PG    MCHC 32.1 30.0 - 36.5 g/dL    RDW 16.0 (H) 11.5 - 14.5 %    Platelets 173 150 - 400 K/uL    MPV 12.3 8.9 - 12.9 FL    Nucleated RBCs 0.2 (H) 0  WBC    nRBC 0.02 (H) 0.00 - 0.01 K/uL   Comprehensive Metabolic Panel    Collection Time: 25  5:51 AM   Result Value Ref Range    Sodium 137 136 - 145 mmol/L    Potassium 3.9 3.5 - 5.1 mmol/L    Chloride 109 (H) 97 - 108 mmol/L    CO2 20 (L) 21 - 32 mmol/L    Anion Gap 8 2 - 12 mmol/L    Glucose 126 (H) 65 - 100 mg/dL    BUN 6 6 - 20 MG/DL    Creatinine 0.71 0.55 - 1.02 MG/DL    BUN/Creatinine Ratio 8 (L) 12 - 20      Est, Glom Filt Rate >90 >60 ml/min/1.73m2    Calcium 9.2 8.5 - 10.1 MG/DL    Total Bilirubin 0.7 0.2 - 1.0 MG/DL    ALT 18 12 - 78 U/L    AST 13 (L) 15 - 37 U/L    Alk Phosphatase 182 (H) 45 - 117 U/L    Total Protein 6.7 6.4 - 8.2 g/dL    Albumin 2.3 (L) 3.5 - 5.0 g/dL    Globulin 4.4 (H) 2.0 - 4.0 g/dL    Albumin/Globulin Ratio 0.5 (L) 1.1 - 2.2         ULTRASOUND  Intrauterine 
Post-Operative  Day 2    Iqra Dunn     Assessment: Post-Op day 2, doing well  Twins in NICU, pt ambulating well to see them   Desires early discharge home yes no: No    Plan:   1. Routine post-operative care    Information for the patient's :  ZACK Dunn A Iqra [[id number]]   , Low Transverse   Information for the patient's :  GILBERTO Dunn B Iqra [[id number]]   , Low Transverse     Patient doing well without significant complaint.   Nausea and vomiting resolved, tolerating po  Flatus yes no: Yes  Voiding: yes  BM yes no: No  Has ambulated with assistance without diff yes no: Yes  Adequate pain management  Breast feeding: NICU    Vitals:  BP (!) 136/91   Pulse 88   Temp 97.9 °F (36.6 °C) (Oral)   Resp 16   LMP 10/26/2024   SpO2 99%   Breastfeeding Unknown   Temp (24hrs), Av °F (36.7 °C), Min:97.5 °F (36.4 °C), Max:98.2 °F (36.8 °C)        Exam:    A,A&O x3  Patient without distress.  Lungs clear to Auscultation Bilat  Abdomen soft, expected tenderness  Bowel sounds present X 4 Quads  Fundus: Firm, Midline at U/-2, appropriately tender.  Breasts soft, NT  Wound dressing intact  Lower extremities are negative cords or tenderness  Edema: 1+ bilateral pedal edema    Labs:   Lab Results   Component Value Date/Time    WBC 8.8 2025 05:32 AM    HGB 7.6 2025 05:32 AM    HCT 23.7 2025 05:32 AM       Recent Results (from the past 24 hours)   CBC    Collection Time: 25  5:32 AM   Result Value Ref Range    WBC 8.8 3.6 - 11.0 K/uL    RBC 3.02 (L) 3.80 - 5.20 M/uL    Hemoglobin 7.6 (L) 11.5 - 16.0 g/dL    Hematocrit 23.7 (L) 35.0 - 47.0 %    MCV 78.5 (L) 80.0 - 99.0 FL    MCH 25.2 (L) 26.0 - 34.0 PG    MCHC 32.1 30.0 - 36.5 g/dL    RDW 16.1 (H) 11.5 - 14.5 %    Platelets 164 150 - 400 K/uL    MPV 11.7 8.9 - 12.9 FL    Nucleated RBCs 0.3 (H) 0  WBC    nRBC 0.03 (H) 0.00 - 0.01 K/uL         A/P:  Iqra Dunn is a 30 y.o.  
Post-Operative  Day 3    Iqra Dunn     Assessment: Post-Op day 3, doing well  Desires discharge home  Denies all s/s of preeclampsia  Blood pressures 130s-140s/80s-90s  Working on pumping/feeding    Plan:   1. Routine post-operative care  2. Will buy cuff to have at home, take daily  3. F/u in office for BP check and incision check in one week  4. Watch and call for increased Bps and any s/s of Pre E  Reviewed neil Martínez MD    Information for the patient's :  ZACK Dunn A Iqra [[id number]]   , Low Transverse   Information for the patient's :  Shaun BOY B Irqa [[id number]]   , Low Transverse     Patient doing well without significant complaint  Ambulating and voiding without difficulty   Nausea and vomiting resolved  Tolerating PO and PO meds well  Passing flatus  BM yes no: Yes  Breastfeeding well established      Vitals:  /85   Pulse 82   Temp 97.9 °F (36.6 °C) (Oral)   Resp 18   LMP 10/26/2024   SpO2 99%   Breastfeeding Unknown   Temp (24hrs), Av °F (36.7 °C), Min:97.9 °F (36.6 °C), Max:98.2 °F (36.8 °C)        Exam:    A,A&Ox3      Patient without distress  Breasts soft, NT  Abdomen soft expected tenderness  FF scant Lochia Rubra  Wound incision clean, dry and intact  Lower extremities are negative for swelling, cords or tenderness.    Labs:   Lab Results   Component Value Date/Time    WBC 8.8 2025 05:32 AM    WBC 10.3 2025 06:09 AM    WBC 12.3 2025 02:46 PM    WBC 10.6 2025 05:51 AM    WBC 5.8 2025 05:57 PM    WBC 8.7 2025 10:29 PM    WBC 7.2 2025 08:27 PM    WBC 6.6 2025 09:21 AM    WBC 4.9 2025 09:31 AM    HGB 7.6 2025 05:32 AM    HGB 7.7 2025 06:09 AM    HGB 9.4 2025 02:46 PM    HGB 8.9 2025 05:51 AM    HGB 9.4 2025 05:57 PM    HGB 10.6 2025 10:29 PM    HGB 9.6 2025 08:27 PM    HGB 10.6 2025 09:21 AM    HGB 11.6 2025 09:31 AM 
Postpartum Progress Note    Subjective: Pt doing well POD1. +nausea overnight. Pain controlled. Lochia less than menses. Tolerating diet w/o N/V. Tinsley catheter out, but has not yet voided, some borderline UOP overnight, needs to void by 11am. Not yet passing flatus. Scant edema. Denies f/c, CP, SOB, or other concerns. Denies HA, VC, RUQ pain or worsening edema. Babies in NICU (one boy, one girl), doing well, she plans to see them later this morning.    Objective:  Patient Vitals for the past 24 hrs:   BP Temp Temp src Pulse Resp SpO2   06/20/25 0615 (!) 142/94 98.2 °F (36.8 °C) Oral 75 16 98 %   06/20/25 0238 (!) 138/94 97.7 °F (36.5 °C) Oral 74 18 100 %   06/19/25 2237 121/79 97.3 °F (36.3 °C) Oral 76 16 98 %   06/19/25 1915 (!) 145/87 97.9 °F (36.6 °C) Oral 84 16 97 %   06/19/25 1854 (!) 146/70 98 °F (36.7 °C) Oral 81 16 100 %   06/19/25 1838 138/71 -- -- 77 16 99 %   06/19/25 1823 129/66 97 °F (36.1 °C) Oral 70 18 99 %   06/19/25 1808 132/66 -- -- 77 16 98 %   06/19/25 1753 129/64 -- -- 79 18 --   06/19/25 1738 132/62 -- -- 78 18 99 %   06/19/25 1733 129/70 -- -- 83 18 100 %   06/19/25 1730 121/69 98.3 °F (36.8 °C) Oral 78 18 98 %   06/19/25 1105 -- -- -- 82 -- --   06/19/25 1100 128/79 -- -- -- -- --       Physical Exam:  Gen-A&O, NAD, resting comfortably   CV-regular rate  Resp-non-labored  Abd-nt, nd, fundus firm below the umbilicus  Incision-c/d/i, dermabond  -scant blood on pad  Ext-trace edema    Last 3 CMP:   Recent Labs     06/17/25  1757 06/19/25  0551    137   K 4.0 3.9    109*   CO2 22 20*   BUN 6 6   CREATININE 0.66 0.71   GLUCOSE 85 126*   CALCIUM 9.3 9.2   BILITOT 0.7 0.7   ALKPHOS 199* 182*   AST 20 13*   ALT 16 18      Last 3 POC Glucose: No results for input(s): \"POCGLU\" in the last 72 hours.   Last 3 CBC:   Recent Labs     06/17/25 1757 06/19/25  0551 06/19/25  1446 06/20/25  0609   WBC 5.8 10.6 12.3* 10.3   RBC 3.73* 3.59* 3.75* 3.09*   HGB 9.4* 8.9* 9.4* 7.7*   HCT 28.7* 27.7* 
Spiritual Health History and Assessment/Progress Note  [facility]    Initial Encounter,  , Life Adjustments,      Name: Iqra Dunn MRN: [id number]    Age: 30 y.o.     Sex: female   Language: English   Scientologist: Other   34 weeks gestation of pregnancy     Date: 2025            Total Time Calculated: 14 min              Spiritual Assessment began in [facility]        Referral/Consult From: Rounding   Encounter Overview/Reason: Initial Encounter  Service Provided For: Patient, Significant other    Josefina, Belief, Meaning:   Patient identifies as spiritual  Family/Friends identify as spiritual      Importance and Influence:  Patient has spiritual/personal beliefs that influence decisions regarding their health  Family/Friends have spiritual/personal beliefs that influence decisions regarding the patient's health    Community:  Patient feels well-supported. Support system includes: Spouse/Partner  Family/Friends feel well-supported. Support system includes: Spouse/Partner    Assessment and Plan of Care:     Patient Interventions include: Facilitated expression of thoughts and feelings, Affirmed coping skills/support systems, and Other: Provided spiritual presence and active listening as patient shared that she was trying to stay calm; she is scheduled for  this afternoon. Acknowledged her feelings and concerns ans offered calming presence and words of support. She shared that she also had a two-year-old daughter at home. Assured her and her Significant Other of prayers on their behalf and of ongoing  availability.  Family/Friends Interventions include: Facilitated expression of thoughts and feelings and Other: Patient's Significant Other was at her bedside. Offered spiritual presence and words of support.    Patient Plan of Care: Spiritual Care available upon further referral  Family/Friends Plan of Care: Spiritual Care available upon further referral      Rev 
Spoke with Dr England from [facility] and she is recommending delivery today because baby A now has decreasing amniotic fluid. Patient is NPO. Called L+D and will plan for  after 3 pm. Pt to stay on continuous monitoring until delivery.     Cisco Brunner MD     
TRANSFER - IN REPORT:    Verbal report received from Sanna Lares RN on Iqra Dunn  being received from L&D for routine progression of patient care      Report consisted of patient's Situation, Background, Assessment and   Recommendations(SBAR).     Information from the following report(s) Nurse Handoff Report, Adult Overview, MAR, Recent Results, and Event Log was reviewed with the receiving nurse.    Opportunity for questions and clarification was provided.      Assessment completed upon patient's arrival to unit and care assumed.       1519 - [facility] called. Patient c/o 4/10 headache, requesting Fioricet. Telephone orders for 2 tabs Fioricet received.     0004 - Fioricet given.   
Update after MFM US this afternoon:  Both babies are IUGR (known prior to admission).   Baby A BPP was 4/8 - with reactive NST was 6/10  Baby B BPP 8/8    Plan is to perform NSTs to tid, keep pt overnight and repeat BPP with MFM tomorrow.  She will complete her rescue course of BMZ this evening at 8pm.    Theresa Ordonez MD    
5.20 M/uL    Hemoglobin 8.9 (L) 11.5 - 16.0 g/dL    Hematocrit 27.7 (L) 35.0 - 47.0 %    MCV 77.2 (L) 80.0 - 99.0 FL    MCH 24.8 (L) 26.0 - 34.0 PG    MCHC 32.1 30.0 - 36.5 g/dL    RDW 16.0 (H) 11.5 - 14.5 %    Platelets 173 150 - 400 K/uL    MPV 12.3 8.9 - 12.9 FL    Nucleated RBCs 0.2 (H) 0  WBC    nRBC 0.02 (H) 0.00 - 0.01 K/uL   Comprehensive Metabolic Panel    Collection Time: 25  5:51 AM   Result Value Ref Range    Sodium 137 136 - 145 mmol/L    Potassium 3.9 3.5 - 5.1 mmol/L    Chloride 109 (H) 97 - 108 mmol/L    CO2 20 (L) 21 - 32 mmol/L    Anion Gap 8 2 - 12 mmol/L    Glucose 126 (H) 65 - 100 mg/dL    BUN 6 6 - 20 MG/DL    Creatinine 0.71 0.55 - 1.02 MG/DL    BUN/Creatinine Ratio 8 (L) 12 - 20      Est, Glom Filt Rate >90 >60 ml/min/1.73m2    Calcium 9.2 8.5 - 10.1 MG/DL    Total Bilirubin 0.7 0.2 - 1.0 MG/DL    ALT 18 12 - 78 U/L    AST 13 (L) 15 - 37 U/L    Alk Phosphatase 182 (H) 45 - 117 U/L    Total Protein 6.7 6.4 - 8.2 g/dL    Albumin 2.3 (L) 3.5 - 5.0 g/dL    Globulin 4.4 (H) 2.0 - 4.0 g/dL    Albumin/Globulin Ratio 0.5 (L) 1.1 - 2.2         Assessment:   34w5d with di/di twin pregnancy with known IUGR of both babies, history of  admitted with pre-eclampsia s/p steroids.   Discussed patient with Dr England from [facility] this morning. Baby A fell off its growth curve and had 6/10 BPP (-2 tone, -2 breathing) yesterday and the plan was for TID 1 hr monitoring and follow up today for another BPP with [facility].   Delivery plan based on today's evaluation at [facility].       Plan:    Continue hospitalization with hospitalized bedrest and Maternal fetal medicine consultation today  [facility] appt at 1115 am per Dr Tomás ESCOBAR until [facility] evaluation is complete.     Cisco Brunner MD

## 2025-06-22 NOTE — DISCHARGE SUMMARY
Obstetrical Discharge Summary     Name: Iqra Dunn MRN: [id number]  SSN: [id number]    YOB: 1995  Age: 30 y.o.  Sex: female      Admit Date: 2025    Discharge Date: 2025     Admitting Physician: Virginia Christy MD     Attending Physician:  Marylu Ro MD     Admission Diagnoses: 34 weeks gestation of pregnancy [Z3A.34]    Discharge Diagnoses:   Information for the patient's :  ZACK Dunn [[id number]]   @[id number]@   Information for the patient's :  GILBERTO Dunn [[id number]]   @[id number]@     Additional Diagnoses:  No components found for: \"OBEXTABORH\", \"OBEXTABSCRN\", \"OBEXTRUBELLA\", \"OBEXTGRBS\"    Hospital Course: Normal hospital course following the delivery.    Disposition at Discharge: Home or self care    Discharged Condition: Stable    Patient Instructions:   Current Discharge Medication List        START taking these medications    Details   oxyCODONE (ROXICODONE) 5 MG immediate release tablet Take 1 tablet by mouth every 4 hours as needed for Pain for up to 3 days. Max Daily Amount: 30 mg  Qty: 5 tablet, Refills: 0    Comments: Reduce doses taken as pain becomes manageable  Associated Diagnoses: Delivery of pregnancy by  section      ibuprofen (ADVIL;MOTRIN) 800 MG tablet Take 1 tablet by mouth in the morning and 1 tablet at noon and 1 tablet in the evening.  Qty: 90 tablet, Refills: 0      docusate sodium (COLACE, DULCOLAX) 100 MG CAPS Take 100 mg by mouth 2 times daily  Qty: 30 capsule, Refills: 0      simethicone (MYLICON) 80 MG chewable tablet Take 1 tablet by mouth every 6 hours as needed for Flatulence  Qty: 30 tablet, Refills: 0           CONTINUE these medications which have NOT CHANGED    Details   Prenatal Multivit-Min-Fe-FA (PRE-TOMÁS PO) Take by mouth      cetirizine (ZYRTEC) 10 MG tablet Take 1 tablet by mouth as needed           STOP taking these medications       Ferrous Sulfate (SLOW FE PO) Comments:

## 2025-06-30 ENCOUNTER — POSTPARTUM VISIT (OUTPATIENT)
Age: 30
End: 2025-06-30

## 2025-06-30 VITALS
TEMPERATURE: 98.4 F | OXYGEN SATURATION: 96 % | RESPIRATION RATE: 16 BRPM | WEIGHT: 200.2 LBS | SYSTOLIC BLOOD PRESSURE: 125 MMHG | BODY MASS INDEX: 33.32 KG/M2 | HEART RATE: 84 BPM | DIASTOLIC BLOOD PRESSURE: 86 MMHG

## 2025-06-30 DIAGNOSIS — Z01.30 BLOOD PRESSURE CHECK: Primary | ICD-10-CM

## 2025-06-30 PROCEDURE — 0502F SUBSEQUENT PRENATAL CARE: CPT | Performed by: OBSTETRICS & GYNECOLOGY

## 2025-06-30 NOTE — PROGRESS NOTES
Blood Pressure check    Iqra Dunn is a 30 y.o. female who presents for a blood pressure check    She is now 1.5 weeks post repeat  section for twin pregnancy - complicated by FGR x 2. Pre-eclampsia without severe features.    Mom and babies are doing well. Babies are still in the NICU- they got a bath today!    The patient is breast pumping and bottle feeding without difficulty.     She is currently taking the following medications None- no longer taking pain medication    /86 (BP Site: Left Upper Arm, Patient Position: Sitting, BP Cuff Size: Medium Adult)   Pulse 84   Temp 98.4 °F (36.9 °C) (Oral)   Resp 16   Wt 90.8 kg (200 lb 3.2 oz)   LMP 10/26/2024   SpO2 96%   BMI 33.32 kg/m²       PHYSICAL EXAMINATION    Constitutional  Appearance: well-nourished, well developed, alert, in no acute distress    HENT  Head and Face: appears normal    Gastrointestinal  Abdominal Examination: abdomen non-tender to palpation, normal bowel sounds, no masses present, incision healing well, mild edema noted above incision  Liver and spleen: no hepatomegaly present, spleen not palpable  Hernias: no hernias identified    Neurologic/Psychiatric  Mental Status:  Orientation: grossly oriented to person, place and time  Mood and Affect: mood normal, affect appropriate    Assessment:  Normal postpartum check  Normal mood  Pre-eclampsia without severe features: Normal blood pressure evaluation    Plan:  Continue monitoring  EPDS 10  Discussed birth control at follow up appointment    Marylu Ro MD

## 2025-06-30 NOTE — PROGRESS NOTES
Iqra Dunn is a 30 y.o. female  presents for a problem visit.    Chief Complaint   Patient presents with    Blood Pressure Check         OB/GYN History   - LTCS x 2  Hx of STI - No  SA - Not Currently, Male      Patient's last menstrual period was 10/26/2024.  Menses: Postpartum Lochia  Contraception: None        The patient is here for a blood pressure check. She denies headaches, scotoma, RUQ pain, and edema.       1. Have you been to the ER, urgent care clinic, or hospitalized since your last visit? No  2. Have you seen or consulted any other health care providers outside of the [facility] since your last visit? No      She declines  a chaperone during the gynecologic exam today.

## 2025-07-31 NOTE — PROGRESS NOTES
Postpartum evaluation    Chief Complaint   Patient presents with    Postpartum Care     6 week PP, would like to discuss BC- would like OCP     Iqra Dunn is a 30 y.o. female who presents for a postpartum exam.     She is now six weeks post repeat  section. Mom and babies (Rosa and Kiya) are doing well.  Both are home from the NICU!    She reports anxiety/depression. Would like to discuss postpartum depression. EDS 15. She reports she has never been on medication before. She denies any SI/HI    She reports that her Incision is healing well. Her bleeding/lochia is appropriate. She reports starting her period.    She is bottle feeding without difficulty.  She reports that she is no longer breastfeeding. She would like cOCPs for menstrual control. She has not been sexually active since delivery.     She is currently taking the following medications: None    Last pap: 2015: NILM/HPV neg    /79   Pulse 82   Temp 98.4 °F (36.9 °C) (Oral)   Resp 16   Wt 89.4 kg (197 lb)   SpO2 98%   Breastfeeding Unknown   BMI 32.78 kg/m²     PHYSICAL EXAMINATION    Constitutional  Appearance: well-nourished, well developed, alert, in no acute distress    HENT  Head and Face: appears normal    Gastrointestinal  Abdominal Examination: abdomen non-tender to palpation, normal bowel sounds, no masses present, incision intact  Liver and spleen: no hepatomegaly present, spleen not palpable  Hernias: no hernias identified, +umbilical hernia     Neurologic/Psychiatric  Mental Status:  Orientation: grossly oriented to person, place and time  Mood and Affect: mood normal, affect appropriate    Assessment:  Normal postpartum check    Post Partum Examination:    Doing well postpartum.  EPDS 15- reviewed resources. She would like to try counseling. Information provided on [facility] and [facility]  Breast and bottle feeding.  Contraception -  Desires cOCPs- rx sent  Next pap due. Recommend scheduling AE  Okay to return

## 2025-08-01 ENCOUNTER — POSTPARTUM VISIT (OUTPATIENT)
Age: 30
End: 2025-08-01

## 2025-08-01 VITALS
HEART RATE: 82 BPM | WEIGHT: 197 LBS | SYSTOLIC BLOOD PRESSURE: 121 MMHG | RESPIRATION RATE: 16 BRPM | OXYGEN SATURATION: 98 % | BODY MASS INDEX: 32.78 KG/M2 | DIASTOLIC BLOOD PRESSURE: 79 MMHG | TEMPERATURE: 98.4 F

## 2025-08-01 PROCEDURE — 0503F POSTPARTUM CARE VISIT: CPT | Performed by: OBSTETRICS & GYNECOLOGY

## 2025-08-01 RX ORDER — NORETHINDRONE ACETATE AND ETHINYL ESTRADIOL AND FERROUS FUMARATE 1MG-20(24)
1 KIT ORAL DAILY
Qty: 1 PACKET | Refills: 11 | Status: SHIPPED | OUTPATIENT
Start: 2025-08-01

## 2025-08-01 NOTE — PROGRESS NOTES
Chief Complaint   Patient presents with    Postpartum Care     6 week PP, would like to discuss BC- would like OCP, and possible depression/anxiety      /79   Pulse 82   Temp 98.4 °F (36.9 °C) (Oral)   Resp 16   Wt 89.4 kg (197 lb)   SpO2 98%   Breastfeeding Unknown   BMI 32.78 kg/m²     1. Have you been to the ER, urgent care clinic since your last visit?  Hospitalized since your last visit?no    2. Have you seen or consulted any other health care providers outside of the [facility] since your last visit?  Include any pap smears or colon screening. No      Postpartum Depression: Medium Risk (2025)    [place]  Depression Scale     Last EPDS Total Score: 6     Last EPDS Self Harm Result: Never     EPDS 15, provider aware.

## 2025-09-07 ENCOUNTER — OFFICE VISIT (OUTPATIENT)
Age: 30
End: 2025-09-07

## 2025-09-07 VITALS
HEART RATE: 79 BPM | RESPIRATION RATE: 18 BRPM | BODY MASS INDEX: 32.36 KG/M2 | DIASTOLIC BLOOD PRESSURE: 80 MMHG | OXYGEN SATURATION: 97 % | WEIGHT: 194.2 LBS | HEIGHT: 65 IN | SYSTOLIC BLOOD PRESSURE: 116 MMHG | TEMPERATURE: 99.1 F

## 2025-09-07 DIAGNOSIS — R51.9 ACUTE NONINTRACTABLE HEADACHE, UNSPECIFIED HEADACHE TYPE: Primary | ICD-10-CM

## 2025-09-07 DIAGNOSIS — R03.0 ELEVATED BLOOD PRESSURE READING: ICD-10-CM

## 2025-09-07 RX ORDER — SUMATRIPTAN SUCCINATE 100 MG/1
100 TABLET ORAL
Qty: 1 TABLET | Refills: 0 | Status: SHIPPED | OUTPATIENT
Start: 2025-09-07

## 2025-09-07 RX ORDER — BUTALBITAL, ACETAMINOPHEN AND CAFFEINE 50; 325; 40 MG/1; MG/1; MG/1
1 TABLET ORAL EVERY 4 HOURS PRN
Qty: 20 TABLET | Refills: 0 | Status: SHIPPED | OUTPATIENT
Start: 2025-09-07

## (undated) DEVICE — C SECTION PACK: Brand: CARDINAL HEALTH

## (undated) DEVICE — AGENT HEMOSTATIC SURG ORIGINAL ABS 2X14IN LOOSE KNIT 12/CA

## (undated) DEVICE — 3M™ TEGADERM™ TRANSPARENT FILM DRESSING FRAME STYLE, 1626W, 4 IN X 4-3/4 IN (10 CM X 12 CM), 50/CT 4CT/CASE: Brand: 3M™ TEGADERM™

## (undated) DEVICE — INTENDED FOR TISSUE SEPARATION, AND OTHER PROCEDURES THAT REQUIRE A SHARP SURGICAL BLADE TO PUNCTURE OR CUT.: Brand: BARD-PARKER ®  SAFETY SCALPED

## (undated) DEVICE — SUTURE MONOCRYL SZ 0 L36IN ABSRB UD L36MM CT-1 1/2 CIR Y946H

## (undated) DEVICE — PENCIL SMK EVAC 10 FT BLADE ELECTRD ROCKER FOR TELSCP

## (undated) DEVICE — LARGE, DISPOSABLE ALEXIS O C-SECTION PROTECTOR - RETRACTOR: Brand: ALEXIS ® O C-SECTION PROTECTOR - RETRACTOR

## (undated) DEVICE — SUTURE VICRYL + SZ 0 L36IN ABSRB VLT L40MM CT 1/2 CIR TAPR VCP358H

## (undated) DEVICE — SUTURE MONOCRYL SZ 4-0 L27IN ABSRB UD L24MM PS-1 3/8 CIR PRIM Y935H

## (undated) DEVICE — ELECTRODE PT RET AD L9FT HI MOIST COND ADH HYDRGEL CORDED

## (undated) DEVICE — SPROTTE TRAY 24G X 4'' (103MM) NEEDLE WITH 40MM INTRODUCER (SPINAL)

## (undated) DEVICE — LIQUIBAND RAPID ADHESIVE 36/CS 0.8ML: Brand: MEDLINE

## (undated) DEVICE — GARMENT,MEDLINE,DVT,INT,CALF,MED, GEN2: Brand: MEDLINE

## (undated) DEVICE — APPLICATOR MEDICATED 26 CC SOLUTION HI LT ORNG CHLORAPREP

## (undated) DEVICE — SUTURE VICRYL  SZ 2-0 L36IN ABSRB VLT L36MM CT-1 1/2 CIR VCP345H